# Patient Record
Sex: FEMALE | Race: OTHER | Employment: UNEMPLOYED | URBAN - METROPOLITAN AREA
[De-identification: names, ages, dates, MRNs, and addresses within clinical notes are randomized per-mention and may not be internally consistent; named-entity substitution may affect disease eponyms.]

---

## 2019-07-15 ENCOUNTER — TELEPHONE (OUTPATIENT)
Dept: INTERNAL MEDICINE CLINIC | Age: 29
End: 2019-07-15

## 2019-07-15 ENCOUNTER — OFFICE VISIT (OUTPATIENT)
Dept: INTERNAL MEDICINE CLINIC | Age: 29
End: 2019-07-15

## 2019-07-15 VITALS
OXYGEN SATURATION: 98 % | SYSTOLIC BLOOD PRESSURE: 107 MMHG | RESPIRATION RATE: 16 BRPM | BODY MASS INDEX: 28.73 KG/M2 | HEIGHT: 66 IN | HEART RATE: 84 BPM | DIASTOLIC BLOOD PRESSURE: 64 MMHG | TEMPERATURE: 98 F | WEIGHT: 178.8 LBS

## 2019-07-15 DIAGNOSIS — R40.20 LOC (LOSS OF CONSCIOUSNESS) (HCC): ICD-10-CM

## 2019-07-15 DIAGNOSIS — Z34.90 PREGNANCY, UNSPECIFIED GESTATIONAL AGE: ICD-10-CM

## 2019-07-15 DIAGNOSIS — J32.9 CHRONIC SINUSITIS, UNSPECIFIED LOCATION: Primary | ICD-10-CM

## 2019-07-15 DIAGNOSIS — N30.00 ACUTE CYSTITIS WITHOUT HEMATURIA: ICD-10-CM

## 2019-07-15 DIAGNOSIS — E07.9 THYROID DISORDER: ICD-10-CM

## 2019-07-15 DIAGNOSIS — E01.0 THYROMEGALY: ICD-10-CM

## 2019-07-15 LAB
BILIRUB UR QL STRIP: NEGATIVE
GLUCOSE UR-MCNC: NEGATIVE MG/DL
KETONES P FAST UR STRIP-MCNC: NORMAL MG/DL
PH UR STRIP: 5 [PH] (ref 4.6–8)
PROT UR QL STRIP: NORMAL
SP GR UR STRIP: 1.02 (ref 1–1.03)
UA UROBILINOGEN AMB POC: NORMAL (ref 0.2–1)
URINALYSIS CLARITY POC: CLEAR
URINALYSIS COLOR POC: YELLOW
URINE BLOOD POC: NORMAL
URINE LEUKOCYTES POC: NEGATIVE
URINE NITRITES POC: NEGATIVE

## 2019-07-15 NOTE — PROGRESS NOTES
HISTORY OF PRESENT ILLNESS  Surya Akbar is a 34 y.o. female [place] refugee presents to establish care. She speaks Divehi,  [id number] facilitates   HPI  She has SOB dizziness, neck feels as if something in it    Not able to breathe well d/t nasal congestion. Saline nasal spray ineffective    Nasal congestion chronic, for seven years. \"meat inside nose\"     If she does use nose drops she cannot do anything     She sometimes loses consciousness, while siting . Symptoms started 4 months ago     Gets SOB and  loses consciousness, usually out for 1.5 minutes    Feel something is bothering her in neck when she can't breathe      Was home with spouse 1 week ago when she had LOC   No seizure activity reported by spouse    LNMP was 1 week before June 1    ED visit June 30. Diagnosed with  UTI and  Pregnancy. Completed antibiotic therapy    Weak, tired all the time. Believes she has thyroid disorder     Drinks 1 L water daily    Children 11 and 10    2 miscarriages      Denies depression or anxiety    Requests form completed to excuse her from attending classes d/t  history of difficulty pregnancy and  Miscarriage      History reviewed. No pertinent past medical history. Current Outpatient Medications   Medication Sig    sodium chloride (AYR SALINE) 0.65 % drop 2 Drops every two (2) hours as needed.  prenatal vit-iron fumarate-fa 27 mg iron- 0.8 mg tab tablet Take 1 Tab by mouth daily. No current facility-administered medications for this visit. History reviewed. No pertinent surgical history. Family History   Problem Relation Age of Onset    Diabetes Mother     Thyroid Disease Mother     Hypertension Father        Review of Systems   Constitutional: Positive for malaise/fatigue. HENT: Positive for congestion and sinus pain. Negative for ear discharge, ear pain, hearing loss, nosebleeds, sore throat and tinnitus. Eyes: Negative.     Respiratory: Positive for shortness of breath. Cardiovascular: Negative for chest pain and palpitations. Gastrointestinal: Negative. Genitourinary: Negative. Musculoskeletal: Positive for neck pain. Skin: Negative. Neurological: Positive for dizziness and loss of consciousness. Negative for tremors, sensory change, speech change, focal weakness, seizures, weakness and headaches. Endo/Heme/Allergies: Positive for environmental allergies. Psychiatric/Behavioral: Negative for depression. The patient is not nervous/anxious and does not have insomnia. Visit Vitals  /64 (BP 1 Location: Left arm, BP Patient Position: Sitting)   Pulse 84   Temp 98 °F (36.7 °C) (Oral)   Resp 16   Ht 5' 6.26\" (1.683 m)   Wt 178 lb 12.8 oz (81.1 kg)   SpO2 98%   BMI 28.63 kg/m²     Physical Exam   Constitutional: She is oriented to person, place, and time. She appears well-developed and well-nourished. No distress. HENT:   Head: Normocephalic and atraumatic. Right Ear: External ear normal.   Left Ear: External ear normal.   Mouth/Throat: Oropharynx is clear and moist. No oropharyngeal exudate. Nasal turbinated, edematous,erythematous    Eyes: Conjunctivae are normal. Right eye exhibits no discharge. Left eye exhibits no discharge. Neck: Thyromegaly present. Cardiovascular: Normal rate, regular rhythm and normal heart sounds. Pulmonary/Chest: Effort normal and breath sounds normal.   Abdominal: Soft. Bowel sounds are normal. She exhibits no distension. There is no tenderness. There is no rebound and no guarding. Musculoskeletal: She exhibits no edema, tenderness or deformity. Lymphadenopathy:     She has no cervical adenopathy. Neurological: She is alert and oriented to person, place, and time. No cranial nerve deficit. Skin: Skin is warm and dry. She is not diaphoretic. Psychiatric: Judgment and thought content normal. Her mood appears anxious.  Her speech is rapid and/or pressured and tangential. Cognition and memory are normal. ASSESSMENT and PLAN    ICD-10-CM ICD-9-CM    1. Chronic sinusitis, unspecified location J32.9 473.9 REFERRAL TO ENT-OTOLARYNGOLOGY   2. Pregnancy, unspecified gestational age Z27.80 V22.2 REFERRAL TO OBSTETRICS AND GYNECOLOGY      prenatal vit-iron fumarate-fa 27 mg iron- 0.8 mg tab tablet   3. Thyroid disorder E07.9 246.9 TSH AND FREE T4   4. LOC (loss of consciousness) ([facility]) R40.20 780.09 HEMOGLOBIN A1C WITH EAG      CBC WITH AUTOMATED DIFF   5. Thyromegaly E01.0 240.9 TSH AND FREE T4      US THYROID/PARATHYROID/SOFT TISS   6. Acute cystitis without hematuria N30.00 595.0 AMB POC URINALYSIS DIP STICK AUTO W/O MICRO     Follow-up and Dispositions    · Return in about 1 day (around 7/16/2019), or if symptoms worsen or fail to improve, for loc,sinusitis. Instructed to start PNV    lab results and schedule of future lab studies reviewed with patient  reviewed medications and side effects in detail      Patient voices understanding and acceptance of this advice and will call back if any further questions or concerns. An After Visit Summary was printed and given to the patient.

## 2019-07-15 NOTE — PATIENT INSTRUCTIONS
Learning About Pregnancy  Your Care Instructions    Your health in the early weeks of your pregnancy is particularly important for your baby's health. Take good care of yourself. Anything you do that harms your body can also harm your baby. Make sure to go to all of your doctor appointments. Regular checkups will help keep you and your baby healthy. How can you care for yourself at home? Diet    · Eat a balanced diet. Make sure your diet includes plenty of beans, peas, and leafy green vegetables.     · Do not skip meals or go for many hours without eating. If you are nauseated, try to eat a small, healthy snack every 2 to 3 hours.     · Do not eat fish that has a high level of mercury, such as shark, swordfish, or mackerel. Do not eat more than one can of tuna each week.     · Drink plenty of fluids, enough so that your urine is light yellow or clear like water. If you have kidney, heart, or liver disease and have to limit fluids, talk with your doctor before you increase the amount of fluids you drink.     · Cut down on caffeine, such as coffee, tea, and cola.     · Do not drink alcohol, such as beer, wine, or hard liquor.     · Take a multivitamin that contains at least 400 micrograms (mcg) of folic acid to help prevent birth defects. Fortified cereal and whole wheat bread are good additional sources of folic acid.     · Increase the calcium in your diet. Try to drink a quart of skim milk each day. You may also take calcium supplements and choose foods such as cheese and yogurt.    Lifestyle    · Make sure you go to your follow-up appointments.     · Get plenty of rest. You may be unusually tired while you are pregnant.     · Get at least 30 minutes of exercise on most days of the week. Walking is a good choice. If you have not exercised in the past, start out slowly. Take several short walks each day.     · Do not smoke. If you need help quitting, talk to your doctor about stop-smoking programs.  These can increase your chances of quitting for good.     · Do not touch cat feces or litter boxes. Also, wash your hands after you handle raw meat, and fully cook all meat before you eat it. Wear gloves when you work in the yard or garden, and wash your hands well when you are done. Cat feces, raw or undercooked meat, and contaminated dirt can cause an infection that may harm your baby or lead to a miscarriage.     · Do not use saunas or hot tubs. Raising your body temperature may harm your baby.     · Avoid chemical fumes, paint fumes, or poisons.     · Do not use illegal drugs or alcohol. Medicines    · Review all of your medicines with your doctor. Some of your routine medicines may need to be changed to protect your baby.     · Use acetaminophen (Tylenol) to relieve minor problems, such as a mild headache or backache or a mild fever with cold symptoms. Do not use nonsteroidal anti-inflammatory drugs (NSAIDs), such as ibuprofen (Advil, Motrin) or naproxen (Aleve), unless your doctor says it is okay.     · Do not take two or more pain medicines at the same time unless the doctor told you to. Many pain medicines have acetaminophen, which is Tylenol. Too much acetaminophen (Tylenol) can be harmful.     · Take your medicines exactly as prescribed. Call your doctor if you think you are having a problem with your medicine.    To manage morning sickness    · If you feel sick when you first wake up, try eating a small snack (such as crackers) before you get out of bed. Allow some time to digest the snack, and then get out of bed slowly.     · Do not skip meals or go for long periods without eating. An empty stomach can make nausea worse.     · Eat small, frequent meals instead of three large meals each day.     · Drink plenty of fluids.  Sports drinks, such as Gatorade or Powerade, are good choices.     · Eat foods that are high in protein but low in fat.     · If you are taking iron supplements, ask your doctor if they are necessary. Iron can make nausea worse.     · Avoid any smells, such as coffee, that make you feel sick.     · Get lots of rest. Morning sickness may be worse when you are tired. Follow-up care is a key part of your treatment and safety. Be sure to make and go to all appointments, and call your doctor if you are having problems. It's also a good idea to know your test results and keep a list of the medicines you take. Where can you learn more? Go to http://leonora-eh.info/. Enter E182 in the search box to learn more about \"Learning About Pregnancy. \"  Current as of: September 5, 2018  Content Version: 11.9  © 4991-5797 [employer]. Care instructions adapted under license by [employer] (which disclaims liability or warranty for this information). If you have questions about a medical condition or this instruction, always ask your healthcare professional. [place] [address] any warranty or liability for your use of this information. Sinusitis: Care Instructions  Your Care Instructions    Sinusitis is an infection of the lining of the sinus cavities in your head. Sinusitis often follows a cold. It causes pain and pressure in your head and face. In most cases, sinusitis gets better on its own in 1 to 2 weeks. But some mild symptoms may last for several weeks. Sometimes antibiotics are needed. Follow-up care is a key part of your treatment and safety. Be sure to make and go to all appointments, and call your doctor if you are having problems. It's also a good idea to know your test results and keep a list of the medicines you take. How can you care for yourself at home? · Take an over-the-counter pain medicine, such as acetaminophen (Tylenol), ibuprofen (Advil, Motrin), or naproxen (Aleve). Read and follow all instructions on the label. · If the doctor prescribed antibiotics, take them as directed.  Do not stop taking them just because you feel better. You need to take the full course of antibiotics. · Be careful when taking over-the-counter cold or flu medicines and Tylenol at the same time. Many of these medicines have acetaminophen, which is Tylenol. Read the labels to make sure that you are not taking more than the recommended dose. Too much acetaminophen (Tylenol) can be harmful. · Breathe warm, moist air from a steamy shower, a hot bath, or a sink filled with hot water. Avoid cold, dry air. Using a humidifier in your home may help. Follow the directions for cleaning the machine. · Use saline (saltwater) nasal washes to help keep your nasal passages open and wash out mucus and bacteria. You can buy saline nose drops at a grocery store or drugstore. Or you can make your own at home by adding 1 teaspoon of salt and 1 teaspoon of baking soda to 2 cups of distilled water. If you make your own, fill a bulb syringe with the solution, insert the tip into your nostril, and squeeze gently. Jennye Garb your nose. · Put a hot, wet towel or a warm gel pack on your face 3 or 4 times a day for 5 to 10 minutes each time. · Try a decongestant nasal spray like oxymetazoline (Afrin). Do not use it for more than 3 days in a row. Using it for more than 3 days can make your congestion worse. When should you call for help? Call your doctor now or seek immediate medical care if:    · You have new or worse swelling or redness in your face or around your eyes.     · You have a new or higher fever.    Watch closely for changes in your health, and be sure to contact your doctor if:    · You have new or worse facial pain.     · The mucus from your nose becomes thicker (like pus) or has new blood in it.     · You are not getting better as expected. Where can you learn more? Go to http://leonora-eh.info/. Enter P382 in the search box to learn more about \"Sinusitis: Care Instructions. \"  Current as of: March 27, 2018  Content Version: 11.9  © 6626-9623 Healthwise, [facility]. Care instructions adapted under license by [employer] (which disclaims liability or warranty for this information). If you have questions about a medical condition or this instruction, always ask your healthcare professional. [address] any warranty or liability for your use of this information. ÇáÊåÇÈ ÇáÌíæÈ ÇáÃäÝíÉ: ÅÑÔÇÏÇÊ ÇáÑÚÇíÉ  [ Sinusitis: Care Instructions ]  ÅÑÔÇÏÇÊ ÇáÑÚÇíÉ      ÇáÊåÇÈ ÇáÌíæÈ ÇáÃäÝíÉ åí ÚÏæì ÊÕíÈ ÈØÇäÉ ÊÌÇæíÝ ÇáÌíæÈ ÇáÃäÝíÉ Ýí ÇáÑÃÓ. æÚÇÏÉ ãÇ íÊÈÚ äÒáÉ ÇáÈÑÏ ÇáÊåÇÈ ÇáÌíæÈ ÇáÃäÝíÉ. æåæ íÊÓÈÈ Ýí Ãáã æÖÛØ Ýí ÇáÑÃÓ æÇáæÌå. æÝí ãÚÙã KXAHBIM¡ íÊÍÓä ÇáÊåÇÈ ÇáÌíæÈ ÇáÃäÝíÉ ãä ÊáÞÇÁ äÝÓå ÎáÇá ÝÊÑÉ ÊÊÑÇæÍ ãä ÃÓÈæÚ Åáì ÃÓÈæÚíä. æáßä ÊÙá ÈÚÖ ÇáÃÚÑÇÖ ÇáØÝíÝÉ áÚÏÉ ÃÓÇÈíÚ. æÝí ÈÚÖ RRYYSOP Êßæä JMQYNBTM ÇáÍíæíÉ ãØáæÈÉ. æÊõÚÏ ãÊÇÈÚÉ ÇáÑÚÇíÉ ÌÒÁðÇ ÑÆíÓíðÇ Ýí ÚáÇÌß æÓáÇãÊß. áÐÇ¡ ÇÍÑÕ Úáì ÊÑÊíÈ ãæÇÚíÏ ÒíÇÑÉ ÇáØÈíÈ HXGONONMD ÈåÇ ÌãíÚðÇ æÇÊÕá ÈØÈíÈß ÅÐÇ ßÇäÊ áÏíß ãÔßáÇÊ. æãä ÇáÌíÏ ßÐáß ãÚÑÝÉ äÊÇÆÌ OVZDGOVC BMRRKTUYM ÈÞÇÆãÉ ÇáÃÏæíÉ ÇáÊí KZAYDUMI. ßíÝ íãßäß ÇáÇÚÊäÇÁ ÈäÝÓß Ýí ÇáãäÒá¿   · ÊäÇæá ÃÍÏ ÃÏæíÉ ÊÓßíä ÇáÃáã ÇáÊí ÊõÕÑÝ Ïæä æÕÝÉ ØÈíÉ ãËá ÃÓíÊÇãíäæÝíä (ÊÇíáíäæá) æÇíÈæÈÑæÝíä (ÃÏÝíá¡ ãæÊÑíä) Ãæ äÇÈÑæßÓíä (ÃáíÝ). ÇÞÑÃ ÌãíÚ WJNYIJAXY ÇáãÈíäÉ Úáì WGLQRA æÇáÊÒã ÈåÇ.  · ÅÐÇ æÕÝ áß ÇáØÈíÈ ÃÍÏ ÇáãÖÇÏÇÊ ÇáÍíæíÉ¡ UPRZWEY ÍÓÈ ÅÑÔÇÏÇÊå. æáÇ ÊÊæÞÝ Úä ÊäÇæáå áãÌÑÏ Ãäß ÊÔÚÑ ÈÊÍÓä. Sylvester Chaparro íáÒãß ÊäÇæá ßæÑÓ ÇáãÖÇÏ ÇáÍíæí ÈÇáßÇãá.  · ÊæÎ ÇáÍÐÑ ÚäÏ ÊäÇæá ÃÏæíÉ äÒáÇÊ ÇáÈÑÏ Ãæ VPKKGAXFMI ÇáÊí ÊõÕÑÝ Ïæä æÕÝÉ ØÈíÉ Åáì ÌÇäÈ ÏæÇÁ ÊÇíáíäæá Ýí ÇáæÞÊ ÐÇÊå. ÍíË ÊÍÊæí ÇáÚÏíÏ ãä åÐå ÇáÃÏæíÉ Úáì ÃÓíÊÇãíäæÝíä¡ æåæ Çáãßæä ÇáÃÓÇÓí Ýí ÊÇíáíäæá. ÇÞÑÃ ÇáãáÕÞÇÊ ááÊÃßÏ ãä ÚÏã ÊÌÇæÒ ÇáÌÑÚÉ ÇáãÞÑÑÉ. ÝÞÏ íáÍÞß ÖÑÑ äÊíÌÉ ÊäÇæá ÌÑÚÉ ÒÇÆÏÉ ãä ÚÞÇÑ ÃÓíÊÇãíäæÝíä (ÊÇíáíäæá).  · ÊäÝÓ åæÇÁð ÏÇÝÆðÇ æÑØÈðÇ ãä ÏÔ ÈÎÇÑ Ãæ ÍãÇã ÓÇÎä Ãæ ÍæÖ ããáæÁ ÈãÇÁ ÓÇÎä. ÊÌäÈ ÇáåæÇÁ ÇáÈÇÑÏ æÇáÌÇÝ. ÞÏ íÝíÏ ÇÓÊÎÏÇã ÌåÇÒ áÊÑØíÈ ÇáÌæ Ýí ÛÑÝÊß. ÇÊÈÚ OTSVNAIIX ÇáÎÇÕÉ ÈÊäÙíÝ åÐÇ ÇáÌåÇÒ.    · ÇÓÊÎÏã ÛÓæáÇð ÃäÝíðÇ Margarite Savers (ãÇÁ ãÇáÍ) ááãÓÇÚÏÉ Ýí ÅÈÞÇÁ RWTQPSB ÇáÃäÝíÉ ECWKDE æäÙøÝ ÇáãÎÇØ æÇáÈßÊíÑíÇ ÈÇáÛÓá. íãßäß ÔÑÇÁ ãÍáæá ãáÍí Ýí Ôßá ÞØÑÇÊ ááÃäÝ ãä ãÊÌÑ ÈÞÇáÉ Ãæ ãä ÅÍÏì ÕíÏáíÇÊ ÇáÃÏæíÉ. Ãæ íãßäß ÊÍÖíÑ ãÍáæá Ýí ÇáãäÒá Úä ØÑíÞ ÅÖÇÝÉ 1 ãáÚÞÉ ÕÛíÑÉ ãä ÇáãáÍ æ1 ãáÚÞÉ ÕÛíÑÉ ãä ÕæÏÇ ÇáÎÈíÒ Åáì ÝäÌÇäíä ãä ÇáãÇÁ ÇáãÞØÑ. æÚäÏ ÅÚÏÇÏ IKDEOL ÇáãáÍí¡ ÇãáÃ KDJAYGV ÇáãØÇØíÉ GFDAHUZJ æÃÏÎá ØÑÝ OEDWLCR ÇáãÓÊÏÞ Ýí ÝÊÍÉ ÇáÃäÝ æÇÖÛØ ÈÑÝÞ. ÇØÑÏ ÇáåæÇÁ ãä ÃäÝß.  · ÖÚ ãäÔÝÉ ãÈááÉ æÓÇÎäÉ Ãæ ßãÇÏÉ Ìíá ÏÇÝÆÉ Úáì æÌåß 3 Ãæ 4 ãÑÇÊ íæãíðÇ áãÏÉ ÊÊÑÇæÍ ãä 5 Åáì 10 ÏÞÇÆÞ Ýí ßá ãÑÉ.  · ÌÑøöÈ ÈÎÇÎ ãÒíáÇð áÇÍÊÞÇä ÇáÃäÝ ãËá ÃæßÓíãíÊÇÒæáíä (ÃÝÑíä). áÇ ÊÓÊÎÏãå áÃßËÑ ãä 3 ÃíÇã ãÊÊÇáíÉ. ÝÞÏ íÄÏí YTSYIRVW áÃßËÑ ãä 3 ÃíÇã Åáì ÒíÇÏÉ ÇáÇÍÊÞÇä. ãÊì íÌÈ YIIWNZB áØáÈ ÇáãÓÇÚÏÉ¿  ÇÊÕá ÈØÈíÈß ÇáÂä Ãæ ÇÈÍË Úä ÇáÑÚÇíÉ ÇáØÈíÉ ÇáÝæÑíÉ Ýí ÇáÍÇáÇÊ ÇáÊÇáíÉ:   · ÇáÅÕÇÈÉ ÈÊæÑã Ãæ ÇÍãÑÇÑ ÌÏíÏ Ýí ÇáæÌå Ãæ Íæá ÇáÚíä Ãæ ÊÏåæÑ ÍÇáÉ ÇáÊæÑã Ãæ MWALWOSK Úä ÇáÓÇÈÞ.  · ÅÕÇÈÊß ÈÍãì ÌÏíÏÉ Ãæ ÇÑÊÝÇÚ ÍÑÇÑÉ Íãì ßÇäÊ áÏíß. íÊÚíä Úáíß ÇáãÑÇÞÈÉ Úä ßËÈ áÃíÉ ÊÛííÑÇÊ ÊØÑÃ Úáì ÇáÕÍÉ QUBATB Úáì DLLJACZ ÈÇáØÈíÈ Ýí MBYZVUA ÇáÊÇáíÉ:   · ÅÕÇÈÊß ÈÃáã ÌÏíÏ Ýí ÇáæÌå Ãæ ÒíÇÏÊå.  · ÒíÇÏÉ Óãß ÇáãÎÇØ ÇáäÇÒá ãä ÇáÃäÝ (ãËá ÇáÞíÍ) Ãæ ÇÍÊæÇÄå Úáì Ïã ÌÏíÏ.  · ÚÏã ÇáÊÍÓøõä ßãÇ åæ ãÊæÞÚ. Ãíä íãßä ãÚÑÝÉ ÇáãÒíÏ¿  ÇäÊÞÇá Åáì [email or web address] ÏÎæá Heike.Miss íãßäß ãÚÑÝÉ ÇáãÒíÏ ãä ÎáÇá ãÑÈÚ ÇáÈÍË \"ÇáÊåÇÈ ÇáÌíæÈ ÇáÃäÝíÉ: ÅÑÔÇÏÇÊ ÇáÑÚÇíÉ - [ Sinusitis: Care Instructions ]. \"  © 1985-9092 Healthwise, [employer]. ÊãÊ ÊåíÆÉ ÅÑÔÇÏÇÊ ÇáÚäÇíÉ ÈãæÌÈ ÊÑÎíÕ ãä ãÎÊÕ ÇáÑÚÇíÉ ÇáÕÍíÉ áÏíß. ÅÐÇ ßÇäÊ áÏíß ÃíÉ DCKPOAZGO Úä ÍÇáÉ ØÈíÉ Ãæ Ãí ãä åÐå HFRWKBMEG¡ ÝÊæÌå ÏæãðÇ CLDJGBB Åáì ãÎÊÕ ÇáÑÚÇíÉ ÇáÕÍíÉ. ÊäÝí ãäÙãÉ [employer], [employer] Piedad Kehr ÖãÇä Ãæ Tia Ishaan PGVVXFI åÐå FFSHULURH. ÅÕÏÇÑ LCEMLQR: 11.9 ãÍÏøË NVPYLERH ãä: 10 ÑÌÈ 2260      ÇáÊÚÑÝ Úáì ÇáÃãæÑ LWMPZOYD TYTTMC  [ Learning About Pregnancy ]  ÅÑÔÇÏÇÊ ÇáÚäÇíÉ ÇáÎÇÕÉ Èß  ÇÚÊäí GKAZKA ÎÇÕÉ Ýí ÇáÃÓÇÈíÚ YCTJDS ãä MKWPS ãä ÃÌá ÕÍÉ ØÝáß. ÇÚÊäí ÈäÝÓß ÌíÏðÇ.  ÝÃí ÔíÁ ÊÝÚáíäå íÖÑ ÈÌÓãß íãßä Ãä íÄÐí ÃíÖðÇ ØÝáß. ÇáÊÒãí ÈÍÖæÑ ÌãíÚ ÇáãæÇÚíÏ ãÚ ÇáØÈíÈ. FQPQPIWBQ [address] æÕÍÉ ØÝáß. Åä ãÊÇÈÚÉ ÇáÑÚÇíÉ ÌÒÁ ÑÆíÓí Ýí ÚáÇÌß æÓáÇãÊß. ÊÃßÏí ãä ÅÌÑÇÁ ÊÑÊíÈÇÊ ÌãíÚ ãæÇÚíÏ ÒíÇÑÉ ÇáØÈíÈ æÇáÐåÇÈ ÅáíåÇ JNYVOOOC ÈØÈíÈß ÅÐÇ ßÇäÊ áÏíß ãÔßáÇÊ. æãä OWAYUNA ÇáÌíÏÉ ÃíÖðÇ ãÚÑÝÉ äÊÇÆÌ JOCLZEGC PHHZLXOVT ÈÞÇÆãÉ YIUOEUXA ÇáÊí JFEAKHOVXI. ßíÝ íãßäß ÇáÇÚÊäÇÁ ÈäÝÓß Ýí JPYVNP¿  ÇáäÙÇã ÇáÛÐÇÆí   · ÊäÇæáí ÇáÃßá æÝÞ äÙÇã ÛÐÇÆí ãÊæÇÒä. ÊÃßÏí ãä ÇÍÊæÇÁ äÙÇãß ÇáÛÐÇÆí Úáì ßãíÉ æÝíÑÉ ãä ÇáÈÞæáíÇÊ OZXHDGJPR MYXCZHTKG ÇáÎÖÑÇÁ ÐÇÊ ÇáÃæÑÇÞ.  · áÇ ÊÊÎØí æÌÈÇÊ Ãæ ÊÌáÓí ÈÏæä ØÚÇã áÝÊÑÉ ØæíáÉ. ÅÐÇ ßäÊ ÊÔÚÑíä ÈÇáÛËíÇä¡ ÝÍÇæáí ÊäÇæá æÌÈÉ ÕÍíÉ ÎÝíÝÉ ÕÛíÑÉ ßá 2 Åáì 3 ÓÇÚÇÊ.  · áÇ ÊÊäÇæáí ÃÓãÇßðÇ ÊÍÊæí Úáì äÓÈÉ ÚÇáíÉ ãä ÇáÒÆÈÞ¡ ãËá Óãß ÇáÞÑÔ Ãæ Óãß ÃÈæ ÓíÝ Ãæ ÇáãÇßÑíá. áÇ ÊÊäÇæáí ÃßËÑ ãä ÚáÈÉ ÊæäÉ ÃÓÈæÚíðÇ.  · ÊäÇæáí ßãíÉ æÝíÑÉ ãä VVAMYOZ ÈãÇ íßÝí áÃä íÕÈÍ áæä Èæáß ÃÕÝÑ TMZNFL Ãæ ÕÇÝíðÇ ãËá ÇáãÇÁ. ÅÐÇ ßäÊ ãä ÇáãÕÇÈíä ÈÃãÑÇÖ Çáßáì Ãæ ÇáÞáÈ Ãæ ÇáßÈÏ æáÒã Úáíß ÊÞáíá AMMXRBK¡ ÝÊÍÏËí ãÚ ØÈíÈß ÞÈá Ãä ÊÞæãí ÈÒíÇÏÉ ßãíÉ NIMGZTB ÇáÊí ÊÔÑÈíäåÇ.  · Þááí ãä ÊäÇæá ÇáßÇÝííä¡ ãËá ÇáÞåæÉ æÇáÔÇí XNQFEXC.  · áÇ ÊÊäÇæáí QINNKJ ãØáÞðÇ¡ ãËá ÇáÈíÑÉ Ãæ ÇáäÈíÐ Ãæ MXRLRBU ÇáßÍæáíÉ.  · ÊäÇæáí ÝíÊÇãíäÇÊ ãÊÚÏÏÉ ÊÍÊæí Úáì ãÇ áÇ íÞá Úä 400 ãíßÑæÌÑÇã ãä ÍãÖ ÇáÝæáíß ááãÓÇÚÏÉ Úáì ãäÚ ÙåæÑ ÚíæÈ ÎáÞíÉ Ýí ÇáãæáæÏ. æÊÚÏ ÇáÍÈæÈ IHCTVRV æÎÈÒ ÇáÞãÍ ÇáßÇãá ãÕÇÏÑ ÅÖÇÝíÉ ÌíÏÉ ãä ÍãÖ ÇáÝæáíß.  · ÃßËÑí ãä ÇáßÇáÓíæã Ýí ÇáäÙÇã ÇáÛÐÇÆí ÇáÐí ÊÊÈÚíäå. ÍÇæáí ÔÑÈ ÑÈÚ ÌÇáæä ãä ÇáÍáíÈ ÇáÎÇáí ãä ÇáÏÓã íæãíðÇ íãßäß ÃíÖðÇ ÊäÇæá ãßãáÇÊ ÇáßÇáÓíæã æÇÎÊíÇÑ ÃØÚãÉ ãËá ÇáÌÈä æÇáÒÈÇÏí. ÃÓáæÈ ÇáÍíÇÉ   · ÇáÊÒãí ÈÍÖæÑ ãæÇÚíÏ ÇáÇÓÊÔÇÑÉ æÇáãÊÇÈÚÉ ãÚ ÇáØÈíÈ.  · ÎõÐí HRPKECA ßÇÝíÉ ãä ÇáÑÇÍÉ. ÞÏ Êßæäíä ãÊÚÈÉ ÈÔßá ÛíÑ ÚÇÏí ÃËäÇÁ ÇáÍãá.  · Þæãí Úáì ÇáÃÞá ÈããÇÑÓÉ ÇáÊãÑíäÇÊ áãÏÉ 30 ÏÞíÞÉ Ýí ãÚÙã ÃíÇã ÇáÃÓÈæÚ. æíÚÏ ÇáãÔí ÎíÇÑðÇ ÌíÏðÇ. ÅÐÇ áã Êßæäí ãä ÇááÇÆí ÇÚÊÏä Úáì ããÇÑÓÉ ÊãÇÑíä Ýí ÇáãÇÖí¡ ÝÇÈÏÆí ÈÔßá ÊÏÑíÌí.  [address] ÚÏÏðÇ ãä ÇáÊãÔíÇÊ ÇáÞÕíÑÉ íæãíðÇ.  · ÃÞáÚí Úä ÇáÊÏÎíä. ÅÐÇ ßäÊ ÈÍÇÌÉ Åáì ãÓÇÚÏÉ ãä ÃÌá ÇáÅÞáÇÚ Úä ÇáÊÏÎíä¡ ÝÊÍÏËí ãÚ ÇáØÈíÈ Íæá ÈÑÇãÌ æÃÏæíÉ æÞÝ ÇáÊÏÎíä. íãßä Ãä íÒíÏ åÐÇ ãä ÝÑÕ ÇáÅÞáÇÚ Úä ÇáÊÏÎíä Åáì ÇáÃÈÏ.  · áÇ ÊáãÓí ÈÑÇÒ ÇáÞØØ Ãæ ÕäÇÏíÞ ÇáÞãÇãÉ. ÃíÖðÇ¡ ÇÛÓáí íÏíß ÈÚÏ GEOACUR ãÚ ÇááÍæã ÇáäíÆÉ¡ æÇÍÑÕí Úáì ÇáØåí ÇáßÇãá áßá ÇááÍã ÞÈá ÊäÇæáå. ÇÑÊÏí ÞÝÇÒÇÊ ÚäÏ ÇáÚãá Ýí ÇáÝäÇÁ Ãæ ÇáÍÏíÞÉ¡ æÇÛÓáí íÏíß ÌíÏðÇ ÚäÏ ÇáÇäÊåÇÁ. íãßä Ãä íÓÈÈ ÈÑÇÒ ÇáÞØØ NNNPPW ÛíÑ MEABGVQ ÌíÏðÇ Ãæ ÇáäíÁ æÇáÊÑÇÈ ÇáãáæË Ýí ÇáÅÕÇÈÉ ÈÚÏæì ÞÏ ÊÖÑ ØÝáß Ãæ ÊÄÏí Åáì ÇáÅÌåÇÖ.  · áÇ ÊÃÎÐí ÍãÇãÇÊ ÈÎÇÑ Ãæ ÊÛÊÓáí Ýí ÃÍæÇÖ ÇáãíÇå ÇáÓÇÎäÉ. Åä ÑÝÚ ÏÑÌÉ ÍÑÇÑÉ ÌÓãß ÞÏ íÖÑ ÈØÝáß.  · ÊÌäÈí ÇáÃÈÎÑÉ ÇáßíãíÇÆíÉ Ãæ ÃÈÎÑÉ ÇáØáÇÁ Ãæ ÇáÓãæã.  · áÇ ÊÊäÇæáí ãØáÞðÇ ÇáÃÏæíÉ ÛíÑ AGSNQVIRQ Ãæ OJANPJ. ÇáÃÏæíÉ   · ÑÇÌÚí ÌãíÚ ÃÏæíÊßö ãÚ ØÈíÈßö. ÞÏ Êßæä åäÇß ÍÇÌÉ Åáì ÊÛííÑ ÈÚÖ ÃÏæíÊßö ÇáÑæÊíäíÉ áÍãÇíÉ ØÝáßö.  · ÊäÇæáí ÇáÃÓíÊÇãíäæÝíä (Êíáíäæá) áÊÓßíä ÇáÂáÇã ÇáÈÓíØÉ¡ ãËá ÇáÕÏÇÚ ÇáÎÝíÝ Ãæ Ãáã ÇáÙåÑ Ãæ ÇáÍãì ÇáÎÝíÝÉ ÇáÊí ÊÕÇÍÈ ÃÚÑÇÖ ÇáÈÑÏ. áÇ ÊÊäÇæáí ÇáÚÞÇÞíÑ ÇááÇÓÊÑæíÏíÉ ÇáãÖÇÏÉ ááÇáÊåÇÈÇÊ (NSAIDs)¡ ãËá ÇáÅíÈæÈÑæÝíä (ÃÏÝíá¡ ãæÊÑíä) Ãæ äÇÈÑæßÓíä (ÃáíÝ)¡ Ïæä Úáã ÇáØÈíÈ.  · áÇ ÊÊäÇæáí ÏæÇÁíä Ãæ ÃßËÑ áÊÎÝíÝ ÇáÂáÇã Ýí æÞÊ æÇÍÏ ÅáÇ æÝÞðÇ áÊæÌíåÇÊ ÇáØÈíÈ. ÍíË ÊÍÊæí ÇáÚÏíÏ ãä ÃÏæíÉ ÚáÇÌ ÇáÃáã Úáì ÃÓíÊÇãíäæÝíä ÇáÐí åæ ÚÈÇÑÉ Úä Êíáíäæá. ÝÞÏ Êßæä ÒíÇÏÉ ÇáÌÑÚÉ ãä ÚÞÇÑ ÃÓíÊÇãíäæÝíä (Êíáíäæá) ÃßËÑ ããÇ íäÈÛí ãÖÑÉ.  · ÊäÇæáí ÇáÃÏæíÉ ÊãÇãðÇ æÝÞÇ áÊæÌíåÇÊ ÇáØÈíÈ. ÇÊÕáí ÈØÈíÈß ÅÐÇ ßäÊ ÊÚÊÞÏíä Ãä áÏíß ãÔßáÉ ÎÇÕÉ ÈÇáÏæÇÁ. ÇáÓíØÑÉ Úáì ÛËíÇä ÇáÕÈÇÍ   · ÅÐÇ ßäÊ ÊÔÚÑíä ÈÇáÅÚíÇÁ ÇáÔÏíÏ ÚäÏ ÈÏÇíÉ ÇáÇÓÊíÞÇÙ¡ ÝÍÇæáí ÊäÇæá æÌÈÉ ÎÝíÝÉ ÕÛíÑÉ (ãËá ÇáÈÓßæíÊ) ÞÈá ÇáÎÑæÌ ãä ÇáÓÑíÑ. ÇãäÍí äÝÓß ÈÚÖ ÇáæÞÊ áåÖã ÇáÈÓßæíÊ¡ Ëã ÇÎÑÌí ãä ÇáÓÑíÑ ÈÈØÁ.  · áÇ ÊÊÎØí æÌÈÇÊ Ãæ ÊÌáÓí ÈÏæä ØÚÇã áÝÊÑÇÊ ØæíáÉ. Åä ÇáãÚÏÉ ÇáÎÇæíÉ ÞÏ ÊÌÚá ÇáÛËíÇä ÃßËÑ ÓæÁðÇ.  · ÊäÇæáí æÌÈÇÊ ÕÛíÑÉ ãÊßÑÑÉ ÈÏáÇð ãä ËáÇË æÌÈÇÊ ßÈíÑÉ íæãíðÇ.  · ÇÔÑÈí ßãíÉ æÇÝÑÉ ãä BTJDJSP. ÊÚÏ ÇáãÔÑæÈÇÊ ÇáÑíÇÖíÉ¡ ãËá Gatorade Ãæ Powerade¡ ÎíÇÑÇÊ ÌíÏÉ.    · ÊäÇæáí ÇáÃØÚãÉ ÇáÛäíÉ ÈÇáÈÑæÊíä æÞáíáÉ ÇáÏåæä.  · ÅÐÇ ßäÊ RODCZXAN ãßãáÇÊ ÇáÍÏíÏ ÇáÛÐÇÆíÉ¡ ÝÇÓÊÔíÑí. ÇáØÈíÈ Úä ãÏì ÃåãíÊåÇ. ÝÇáÍÏíÏ ãä Çáããßä Ãä íÄÏí Åáì ÊÝÇÞã ÇáÛËíÇä.  · ÊÌäÈí ÃíÉ ÑæÇÆÍ¡ ãËá ÑÇÆÍÉ ÇáÞåæÉ¡ ÇáÊí ÊÌÚáß ÊÔÚÑíä ÈÇáÊÚÈ.  · ÎõÐí XUNEEQU ßÇÝíÉ ãä ÇáÑÇÍÉ. Åä ÛËíÇä ÇáÕÈÇÍ ÞÏ íÓæÁ ÚäÏãÇ Êßæäíä ãÊÚÈÉ. Ãíä íãßä ãÚÑÝÉ ÇáãÒíÏ¿  ÇäÊÞÇá Åáì [email or web address] ÏÎæá Clef.Plain íãßäß ãÚÑÝÉ ÇáãÒíÏ ãä JFOD ãÑÈÚ VZZFG \"ÇáÊÚÑÝ Úáì ÇáÃãæÑ ZMOYPAVC PDUYGD - [ Saundra Milling About Pregnancy ]. \"  © 2601-1915 [employer], [employer]. ÊãÊ ÊåíÆÉ ÅÑÔÇÏÇÊ ÇáÚäÇíÉ ÈãæÌÈ ÊÑÎíÕ ãä ãÎÊÕ ÇáÑÚÇíÉ ÇáÕÍíÉ áÏíß. ÅÐÇ ßÇäÊ áÏíß ÃíÉ ECUAGWOOD Úä ÍÇáÉ ØÈíÉ Ãæ Ãí ãä åÐå PRPTRHZCK¡ ÝÊæÌå ÏæãðÇ TUAEVYS Åáì ãÎÊÕ ÇáÑÚÇíÉ ÇáÕÍíÉ. ÊäÝí ãäÙãÉ [employer], [employer] Tarik Hence ÖãÇä Ãæ [place] Guillaume [id number] åÐå [id number].   ÅÕÏÇÑ ÇáãÍÊæì: 11.9 ãÍÏøË [id number] ãä: 25 NP [id number]

## 2019-07-15 NOTE — PROGRESS NOTES
Rm 7 Kiny[address]anda speaking [id number]    Chief Complaint   Patient presents with   [address]     pregnant not sure how far along she is.  Neck Pain   Pt would like printed Rx's, no pharmacy at the moment  Pt states that she has a problem with her neck, and its causing her to have SOB at times. 1. Have you been to the ER, urgent care clinic since your last visit? Hospitalized since your last visit? [facility], 6/30/19 UTI    2. Have you seen or consulted any other health care providers outside of the [address] [place] [address] since your last visit? Include any pap smears or colon screening.  No    Health Maintenance Due   Topic Date Due    DTaP/Tdap/Td series (1 - Tdap) 02/07/2011    PAP AKA CERVICAL CYTOLOGY  02/07/2011   Pt cannot remember last pap smear     3 most recent PHQ Screens 7/15/2019   Little interest or pleasure in doing things Not at all   Feeling down, depressed, irritable, or hopeless Not at all   Total Score PHQ 2 0       Learning Assessment 7/15/2019   PRIMARY LEARNER Patient   HIGHEST LEVEL OF EDUCATION - PRIMARY LEARNER  DID NOT GRADUATE [employer]   PRIMARY LANGUAGE OTHER (COMMENT)   LEARNER PREFERENCE PRIMARY VIDEOS   ANSWERED BY patient   RELATIONSHIP SELF

## 2019-07-16 LAB
BASOPHILS # BLD AUTO: 0 X10E3/UL (ref 0–0.2)
BASOPHILS NFR BLD AUTO: 0 %
EOSINOPHIL # BLD AUTO: 0.1 X10E3/UL (ref 0–0.4)
EOSINOPHIL NFR BLD AUTO: 1 %
ERYTHROCYTE [DISTWIDTH] IN BLOOD BY AUTOMATED COUNT: 14.7 % (ref 12.3–15.4)
EST. AVERAGE GLUCOSE BLD GHB EST-MCNC: 100 MG/DL
HBA1C MFR BLD: 5.1 % (ref 4.8–5.6)
HCT VFR BLD AUTO: 41.1 % (ref 34–46.6)
HGB BLD-MCNC: 13.2 G/DL (ref 11.1–15.9)
IMM GRANULOCYTES # BLD AUTO: 0 X10E3/UL (ref 0–0.1)
IMM GRANULOCYTES NFR BLD AUTO: 0 %
LYMPHOCYTES # BLD AUTO: 2.8 X10E3/UL (ref 0.7–3.1)
LYMPHOCYTES NFR BLD AUTO: 27 %
MCH RBC QN AUTO: 28.1 PG (ref 26.6–33)
MCHC RBC AUTO-ENTMCNC: 32.1 G/DL (ref 31.5–35.7)
MCV RBC AUTO: 88 FL (ref 79–97)
MONOCYTES # BLD AUTO: 0.7 X10E3/UL (ref 0.1–0.9)
MONOCYTES NFR BLD AUTO: 7 %
NEUTROPHILS # BLD AUTO: 6.7 X10E3/UL (ref 1.4–7)
NEUTROPHILS NFR BLD AUTO: 65 %
PLATELET # BLD AUTO: 319 X10E3/UL (ref 150–450)
RBC # BLD AUTO: 4.69 X10E6/UL (ref 3.77–5.28)
T4 FREE SERPL-MCNC: 1.2 NG/DL (ref 0.82–1.77)
TSH SERPL DL<=0.005 MIU/L-ACNC: 2.77 UIU/ML (ref 0.45–4.5)
WBC # BLD AUTO: 10.3 X10E3/UL (ref 3.4–10.8)

## 2019-07-23 LAB
ANTIBODY SCREEN, EXTERNAL: NEGATIVE
HBSAG, EXTERNAL: NEGATIVE
HIV, EXTERNAL: NEGATIVE
RUBELLA, EXTERNAL: NORMAL
T. PALLIDUM, EXTERNAL: NONREACTIVE
TYPE, ABO & RH, EXTERNAL: NORMAL

## 2019-08-16 ENCOUNTER — APPOINTMENT (OUTPATIENT)
Dept: ULTRASOUND IMAGING | Age: 29
End: 2019-08-16
Attending: EMERGENCY MEDICINE
Payer: MEDICAID

## 2019-08-16 ENCOUNTER — HOSPITAL ENCOUNTER (EMERGENCY)
Age: 29
Discharge: HOME | End: 2019-08-16
Attending: EMERGENCY MEDICINE
Payer: MEDICAID

## 2019-08-16 VITALS
SYSTOLIC BLOOD PRESSURE: 116 MMHG | HEART RATE: 81 BPM | OXYGEN SATURATION: 100 % | DIASTOLIC BLOOD PRESSURE: 74 MMHG | RESPIRATION RATE: 16 BRPM | TEMPERATURE: 98.6 F

## 2019-08-16 DIAGNOSIS — O20.0 THREATENED ABORTION: Primary | ICD-10-CM

## 2019-08-16 LAB
ALBUMIN SERPL-MCNC: 3.4 G/DL (ref 3.5–5)
ALBUMIN/GLOB SERPL: 0.9 {RATIO} (ref 1.1–2.2)
ALP SERPL-CCNC: 49 U/L (ref 45–117)
ALT SERPL-CCNC: 19 U/L (ref 12–78)
ANION GAP SERPL CALC-SCNC: 9 MMOL/L (ref 5–15)
AST SERPL-CCNC: 9 U/L (ref 15–37)
BASOPHILS # BLD: 0 K/UL (ref 0–0.1)
BASOPHILS NFR BLD: 0 % (ref 0–1)
BILIRUB SERPL-MCNC: 0.5 MG/DL (ref 0.2–1)
BUN SERPL-MCNC: 5 MG/DL (ref 6–20)
BUN/CREAT SERPL: 9 (ref 12–20)
CALCIUM SERPL-MCNC: 9.2 MG/DL (ref 8.5–10.1)
CHLORIDE SERPL-SCNC: 105 MMOL/L (ref 97–108)
CO2 SERPL-SCNC: 23 MMOL/L (ref 21–32)
CREAT SERPL-MCNC: 0.56 MG/DL (ref 0.55–1.02)
DIFFERENTIAL METHOD BLD: NORMAL
EOSINOPHIL # BLD: 0.1 K/UL (ref 0–0.4)
EOSINOPHIL NFR BLD: 1 % (ref 0–7)
ERYTHROCYTE [DISTWIDTH] IN BLOOD BY AUTOMATED COUNT: 13.2 % (ref 11.5–14.5)
GLOBULIN SER CALC-MCNC: 3.9 G/DL (ref 2–4)
GLUCOSE SERPL-MCNC: 82 MG/DL (ref 65–100)
HCG SERPL-ACNC: ABNORMAL MIU/ML (ref 0–6)
HCT VFR BLD AUTO: 36.6 % (ref 35–47)
HGB BLD-MCNC: 12.1 G/DL (ref 11.5–16)
IMM GRANULOCYTES # BLD AUTO: 0 K/UL (ref 0–0.04)
IMM GRANULOCYTES NFR BLD AUTO: 0 % (ref 0–0.5)
LYMPHOCYTES # BLD: 2 K/UL (ref 0.8–3.5)
LYMPHOCYTES NFR BLD: 20 % (ref 12–49)
MCH RBC QN AUTO: 28.8 PG (ref 26–34)
MCHC RBC AUTO-ENTMCNC: 33.1 G/DL (ref 30–36.5)
MCV RBC AUTO: 87.1 FL (ref 80–99)
MONOCYTES # BLD: 0.6 K/UL (ref 0–1)
MONOCYTES NFR BLD: 6 % (ref 5–13)
NEUTS SEG # BLD: 7.2 K/UL (ref 1.8–8)
NEUTS SEG NFR BLD: 73 % (ref 32–75)
NRBC # BLD: 0 K/UL (ref 0–0.01)
NRBC BLD-RTO: 0 PER 100 WBC
PLATELET # BLD AUTO: 223 K/UL (ref 150–400)
PMV BLD AUTO: 12.6 FL (ref 8.9–12.9)
POTASSIUM SERPL-SCNC: 3.7 MMOL/L (ref 3.5–5.1)
PROT SERPL-MCNC: 7.3 G/DL (ref 6.4–8.2)
RBC # BLD AUTO: 4.2 M/UL (ref 3.8–5.2)
SODIUM SERPL-SCNC: 137 MMOL/L (ref 136–145)
WBC # BLD AUTO: 9.9 K/UL (ref 3.6–11)

## 2019-08-16 PROCEDURE — 80053 COMPREHEN METABOLIC PANEL: CPT

## 2019-08-16 PROCEDURE — 84702 CHORIONIC GONADOTROPIN TEST: CPT

## 2019-08-16 PROCEDURE — 76817 TRANSVAGINAL US OBSTETRIC: CPT

## 2019-08-16 PROCEDURE — 86900 BLOOD TYPING SEROLOGIC ABO: CPT

## 2019-08-16 PROCEDURE — 76801 OB US < 14 WKS SINGLE FETUS: CPT

## 2019-08-16 PROCEDURE — 99283 EMERGENCY DEPT VISIT LOW MDM: CPT

## 2019-08-16 PROCEDURE — 85025 COMPLETE CBC W/AUTO DIFF WBC: CPT

## 2019-08-16 PROCEDURE — 36415 COLL VENOUS BLD VENIPUNCTURE: CPT

## 2019-08-16 NOTE — ED TRIAGE NOTES
reports pt is currently 10-12 weeks pregnant and is having bright red vaginal bleeding. Pt speaks Czech and  is requesting female doctor.

## 2019-08-17 ENCOUNTER — HOSPITAL ENCOUNTER (EMERGENCY)
Age: 29
Discharge: HOME | End: 2019-08-18
Attending: EMERGENCY MEDICINE
Payer: MEDICAID

## 2019-08-17 VITALS
SYSTOLIC BLOOD PRESSURE: 115 MMHG | DIASTOLIC BLOOD PRESSURE: 75 MMHG | RESPIRATION RATE: 16 BRPM | HEART RATE: 98 BPM | OXYGEN SATURATION: 98 % | TEMPERATURE: 98.3 F

## 2019-08-17 DIAGNOSIS — O20.9 VAGINAL BLEEDING AFFECTING EARLY PREGNANCY: Primary | ICD-10-CM

## 2019-08-17 LAB
ABO + RH BLD: NORMAL
BLOOD GROUP ANTIBODIES SERPL: NORMAL
SPECIMEN EXP DATE BLD: NORMAL

## 2019-08-17 PROCEDURE — 99282 EMERGENCY DEPT VISIT SF MDM: CPT

## 2019-08-17 NOTE — ED PROVIDER NOTES
HPI       34y F here with vaginal bleeding. She is approx 10w pregnant. Started about 1.5 hours ago. Has some diffuse lower abdominal pain. Hx of ectopic pregnancy. No back or flank pain. No fever. No vomiting. Pregnancy has otherwise been ok. Pt reports this is 4th pregnancy - other pregnancies also complicated by premature births. Hx obtained using Urdu  phone. Pt and  asking for female provider to do physical exam.    History reviewed. No pertinent past medical history. History reviewed. No pertinent surgical history.       Family History:   Problem Relation Age of Onset    Diabetes Mother     Thyroid Disease Mother     Hypertension Father        Social History     Socioeconomic History    Marital status:      Spouse name: Not on file    Number of children: Not on file    Years of education: Not on file    Highest education level: Not on file   Occupational History    Not on file   Social Needs    Financial resource strain: Not on file    Food insecurity:     Worry: Not on file     Inability: Not on file    Transportation needs:     Medical: Not on file     Non-medical: Not on file   Tobacco Use    Smoking status: Never Smoker    Smokeless tobacco: Never Used   Substance and Sexual Activity    Alcohol use: Never     Frequency: Never    Drug use: Never    Sexual activity: Yes     Partners: Female     Birth control/protection: None   Lifestyle    Physical activity:     Days per week: Not on file     Minutes per session: Not on file    Stress: Not on file   Relationships    Social connections:     Talks on phone: Not on file     Gets together: Not on file     Attends Cheondoism service: Not on file     Active member of club or organization: Not on file     Attends meetings of clubs or organizations: Not on file     Relationship status: Not on file    Intimate partner violence:     Fear of current or ex partner: Not on file     Emotionally abused: Not on file Physically abused: Not on file     Forced sexual activity: Not on file   Other Topics Concern    Not on file   Social History Narrative    Not on file         ALLERGIES: Patient has no known allergies. Review of Systems   Review of Systems   Constitutional: (-) weight loss. HEENT: (-) stiff neck   Eyes: (-) discharge. Respiratory: (-) cough. Cardiovascular: (-) syncope. Gastrointestinal: (-) blood in stool. Genitourinary: (-) hematuria. Musculoskeletal: (-) myalgias. Neurological: (-) seizure. Skin: (-) petechiae  Lymph/Immunologic: (-) enlarged lymph nodes  All other systems reviewed and are negative. Vitals:    08/16/19 2055   BP: 118/75   Pulse: 86   Resp: 16   Temp: 98.1 °F (36.7 °C)   SpO2: 99%            Physical Exam Nursing note and vitals reviewed. Constitutional: oriented to person, place, and time. appears well-developed and well-nourished. No distress. Head: Normocephalic and atraumatic. Sclera anicteric  Nose: No rhinorrhea  Mouth/Throat: Oropharynx is clear and moist. Pharynx normal  Eyes: Conjunctivae are normal. Pupils are equal, round, and reactive to light. Right eye exhibits no discharge. Left eye exhibits no discharge. Neck: Painless normal range of motion. Neck supple. No LAD. Cardiovascular: Normal rate, regular rhythm, normal heart sounds and intact distal pulses. Exam reveals no gallop and no friction rub. No murmur heard. Pulmonary/Chest:  No respiratory distress. No wheezes. No rales. No rhonchi. No increased work of breathing. No accessory muscle use. Good air exchange throughout. Abdominal: soft, non-tender, no rebound or guarding. No hepatosplenomegaly. Normal bowel sounds throughout. Back: no tenderness to palpation, no deformities, no CVA tenderness  Extremities/Musculoskeletal: Normal range of motion. no tenderness. No edema. Distal extremities are neurovasc intact. Lymphadenopathy:   No adenopathy.    Neurological:  Alert and oriented to person, place, and time. Coordination normal. CN 2-12 intact. Motor and sensory function intact. Skin: Skin is warm and dry. No rash noted. No pallor. MDM 34y F here with vaginal bleeding. Large subchorionic bleed on ultrasound and still some bleeding on exam. Discussed with ob - same threatened ab precautions. Will see if she can move her follow-up sooner.        Procedures

## 2019-08-17 NOTE — DISCHARGE INSTRUCTIONS
Patient Education        Threatened Miscarriage: Care Instructions  Your Care Instructions    Some women have light spotting or bleeding during the first 12 weeks of pregnancy. In some cases this is normal. Light spotting or bleeding can also be a sign of a possible loss of the pregnancy. This is called a threatened miscarriage. At this point, the doctor may not be able to tell if your vaginal bleeding is normal or is a sign of a miscarriage. In early pregnancy, things such as stress, exercise, and sex do not cause miscarriage. You may be worried or upset about the possibility of losing your pregnancy. But do not blame yourself. There is no treatment to stop a threatened miscarriage. If you do have a miscarriage, there was nothing you could have done to prevent it. A miscarriage usually means that the pregnancy is not developing normally. The doctor has checked you carefully, but problems can develop later. If you notice any problems or new symptoms, get medical treatment right away. Follow-up care is a key part of your treatment and safety. Be sure to make and go to all appointments, and call your doctor if you are having problems. It's also a good idea to know your test results and keep a list of the medicines you take. How can you care for yourself at home? · If you do have a miscarriage, you will probably have some vaginal bleeding for 1 to 2 weeks. Use pads instead of tampons. · Take acetaminophen (Tylenol) for cramps. Read and follow all instructions on the label. · Do not take two or more pain medicines at the same time unless the doctor told you to. Many pain medicines have acetaminophen, which is Tylenol. Too much acetaminophen (Tylenol) can be harmful. · Do not have sex until your doctor says it is okay. · Get lots of rest over the next several days. · You may do your normal activities if you feel well enough to do them. But do not do any heavy exercise until your doctor says it is okay.   · Eat a balanced diet that is high in iron and vitamin C. Foods rich in iron include red meat, shellfish, eggs, beans, and leafy green vegetables. Foods high in vitamin C include citrus fruits, tomatoes, and broccoli. Talk to your doctor about whether you need to take iron pills or a multivitamin. · Do not drink alcohol or use tobacco or illegal drugs. · Do not smoke. If you need help quitting, talk to your doctor about stop-smoking programs and medicines. These can increase your chances of quitting for good. When should you call for help? Call 911 anytime you think you may need emergency care. For example, call if:    · You passed out (lost consciousness).    Call your doctor now or seek immediate medical care if:    · You have severe vaginal bleeding.     · You are dizzy or lightheaded, or you feel like you may faint.     · You have new or worse pain in your belly or pelvis.     · You have a fever.     · You have vaginal discharge that smells bad.    Watch closely for changes in your health, and be sure to contact your doctor if:    · You do not get better as expected. Where can you learn more? Go to http://leonora-eh.info/. Enter Y217 in the search box to learn more about \"Threatened Miscarriage: Care Instructions. \"  Current as of: September 5, 2018  Content Version: 12.1  © 2459-6285 [employer]. Care instructions adapted under license by [employer] (which disclaims liability or warranty for this information). If you have questions about a medical condition or this instruction, always ask your healthcare professional. [place] [address] any warranty or liability for your use of this information.

## 2019-08-17 NOTE — ED NOTES
Pelvic exam performed due to patient request for female provider. Moderate amount of dark red vaginal bleeding noted, with mild vaginal tenderness. No cervical motion tenderness. Mild uterine tenderness, no appreciable adnexal fullness or tenderness. Samples collected for KOH, wet prep and GC/Chlamydia.

## 2019-08-18 NOTE — DISCHARGE INSTRUCTIONS
Patient Education        Subchorionic Hematoma: Care Instructions  Your Care Instructions    A subchorionic hematoma or hemorrhage is bleeding under one of the membranes (chorion) that surrounds the embryo inside the uterus. It is a common cause of bleeding in early pregnancy. The main symptom is vaginal bleeding. But some women don't have symptoms. They may find out they have a hematoma during an ultrasound test.  In most cases, the bleeding goes away on its own. Most women go on to have a healthy baby. But in some cases, the bleeding is a sign of a miscarriage or other problem with the pregnancy. Your doctor may want to do a follow-up ultrasound. Follow-up care is a key part of your treatment and safety. Be sure to make and go to all appointments, and call your doctor if you are having problems. It's also a good idea to know your test results and keep a list of the medicines you take. How can you care for yourself at home? · Keep track of any bleeding, and follow the guidelines for when to call your doctor. · Keep in mind that some bleeding during the first trimester or an abnormal finding on an ultrasound may:  ? Not cause any problems for you or the baby. ? Turn out to be something more serious. But if this happens, it's best to find out early. Then you and your doctor can manage any complications sooner rather than later. When should you call for help? Call 911 anytime you think you may need emergency care. For example, call if:    · You have sudden, severe pain in your belly or pelvis.     · You passed out (lost consciousness).     · You have severe vaginal bleeding.    Call your doctor now or seek immediate medical care if:    · You are dizzy or lightheaded, or you feel like you may faint.     · You have new or increased pain in your belly or pelvis.     · You have new or more vaginal bleeding.     · You have pain in the vaginal area.     · You have a fever.     · You think you may have passed tissue. Save any tissue that you pass. Take it to your doctor's office as soon as you can.    Watch closely for changes in your health, and be sure to contact your doctor if:    · You have new or worse vaginal symptoms, such as pain, itching, or a discharge.     · You do not get better as expected. Where can you learn more? Go to http://leonora-eh.info/. Ermelinda Castleman in the search box to learn more about \"Subchorionic Hematoma: Care Instructions. \"  Current as of: September 5, 2018  Content Version: 12.1  © 7415-5201 [employer]. Care instructions adapted under license by [employer] (which disclaims liability or warranty for this information). If you have questions about a medical condition or this instruction, always ask your healthcare professional. [address] any warranty or liability for your use of this information.

## 2019-08-18 NOTE — ED NOTES
Discharge instructions discussed with pt by provider using [employer] phone. . Pt verbalized understanding. Pt ambulatory from department, gait steady.

## 2019-08-18 NOTE — ED TRIAGE NOTES
Pt arrives ambulatory to department with CC of vaginal bleeding and is currently 12 weeks pregnant. Pt was seen here last night for the same complaint, where she had an ultrasound that showed a viable pregnancy. Pt states that her vaginal bleeding has increased since last night. Pt has not followed up with her OB since last night. This triage note was translated using the "[employer] phone.

## 2019-08-18 NOTE — ED PROVIDER NOTES
34 y.o. Female ( A2) with no significant past medical history presents ambulatory and accompanied by spouse with chief complaint of increased vaginal bleeding since being seen here last night in the ED. The pt explains that she is currently 12 weeks pregnant with established OB/GYN care here at [facility], noting that she has not experienced any complications with her pregnancy thus far. Per chart records, the pt had a normal US yesterday indicating normal IUP at 11 weeks, with a large subchorionic hemorrhage. She also had a quant of [id number]. Pt denies any pain, nausea, vomiting, or any other symptoms at this time. There are no other acute medical concerns at this time. Social hx: Patient denies Tobacco use. Denies EtOH use. Denies illicit drug abuse. PCP: Jory Fernando NP    Note written by [employer], as dictated by Leela Yeung MD 11:44 PM      The history is provided by the patient and the spouse. A  was used. History reviewed. No pertinent past medical history. History reviewed. No pertinent surgical history.       Family History:   Problem Relation Age of Onset    Diabetes Mother     Thyroid Disease Mother     Hypertension Father        Social History     Socioeconomic History    Marital status:      Spouse name: Not on file    Number of children: Not on file    Years of education: Not on file    Highest education level: Not on file   Occupational History    Not on file   Social Needs    Financial resource strain: Not on file    Food insecurity:     Worry: Not on file     Inability: Not on file    Transportation needs:     Medical: Not on file     Non-medical: Not on file   Tobacco Use    Smoking status: Never Smoker    Smokeless tobacco: Never Used   Substance and Sexual Activity    Alcohol use: Never     Frequency: Never    Drug use: Never    Sexual activity: Yes     Partners: Female     Birth control/protection: None   Lifestyle  Physical activity:     Days per week: Not on file     Minutes per session: Not on file    Stress: Not on file   Relationships    Social connections:     Talks on phone: Not on file     Gets together: Not on file     Attends Episcopalian service: Not on file     Active member of club or organization: Not on file     Attends meetings of clubs or organizations: Not on file     Relationship status: Not on file    Intimate partner violence:     Fear of current or ex partner: Not on file     Emotionally abused: Not on file     Physically abused: Not on file     Forced sexual activity: Not on file   Other Topics Concern    Not on file   Social History Narrative    Not on file         ALLERGIES: Patient has no known allergies. Review of Systems   Constitutional: Negative for chills and fever. Respiratory: Negative for shortness of breath. Cardiovascular: Negative for chest pain. Gastrointestinal: Negative for abdominal pain, blood in stool, diarrhea, nausea and vomiting. Genitourinary: Positive for vaginal bleeding. Negative for difficulty urinating, dysuria and pelvic pain. All other systems reviewed and are negative. Vitals:    08/17/19 2328   BP: 115/75   Pulse: 98   Resp: 16   Temp: 98.3 °F (36.8 °C)   SpO2: 98%            Physical Exam   Constitutional: She is oriented to person, place, and time. She appears well-developed and well-nourished. No distress. HENT:   Head: Normocephalic and atraumatic. Mouth/Throat: Oropharynx is clear and moist.   Eyes: Pupils are equal, round, and reactive to light. No scleral icterus. Neck: Normal range of motion. Neck supple. No thyromegaly present. Cardiovascular: Normal rate, regular rhythm, normal heart sounds and intact distal pulses. No murmur heard. Pulmonary/Chest: Effort normal and breath sounds normal. No respiratory distress. Abdominal: Soft. Bowel sounds are normal. She exhibits no distension. There is no tenderness.    Musculoskeletal: Normal range of motion. She exhibits no edema. Neurological: She is alert and oriented to person, place, and time. Skin: Skin is warm and dry. No rash noted. She is not diaphoretic. Nursing note and vitals reviewed. Note written by Elsa Rodriguez, as dictated by Kimmie Medina MD 11:44 PM    MDM  Number of Diagnoses or Management Options  Vaginal bleeding affecting early pregnancy:   Diagnosis management comments: A:  Vag bleeding in first trimester preg. VS stable. Had Pelvic US and labs done yesterday. Today, bedside EMBU shows IUP with + FHT. + subchorionic hemorrhage. Stable for discharge to f/u with OB.          Procedures

## 2019-09-23 LAB — GRBS, EXTERNAL: NORMAL

## 2020-02-28 ENCOUNTER — HOSPITAL ENCOUNTER (OUTPATIENT)
Dept: OTHER | Age: 30
Discharge: HOME | DRG: 540 | End: 2020-02-28
Payer: MEDICAID

## 2020-02-28 ENCOUNTER — ANESTHESIA EVENT (OUTPATIENT)
Dept: LABOR AND DELIVERY | Age: 30
DRG: 540 | End: 2020-02-28
Payer: MEDICAID

## 2020-02-28 ENCOUNTER — TELEPHONE (OUTPATIENT)
Dept: OBGYN | Age: 30
End: 2020-02-28

## 2020-02-28 VITALS — WEIGHT: 189 LBS | BODY MASS INDEX: 30.37 KG/M2 | HEIGHT: 66 IN

## 2020-02-28 LAB
ERYTHROCYTE [DISTWIDTH] IN BLOOD BY AUTOMATED COUNT: 13 % (ref 11.5–14.5)
HCT VFR BLD AUTO: 40.5 % (ref 35–47)
HGB BLD-MCNC: 13.7 G/DL (ref 11.5–16)
MCH RBC QN AUTO: 30.3 PG (ref 26–34)
MCHC RBC AUTO-ENTMCNC: 33.8 G/DL (ref 30–36.5)
MCV RBC AUTO: 89.6 FL (ref 80–99)
NRBC # BLD: 0 K/UL (ref 0–0.01)
NRBC BLD-RTO: 0 PER 100 WBC
PLATELET # BLD AUTO: 217 K/UL (ref 150–400)
PMV BLD AUTO: 12.5 FL (ref 8.9–12.9)
RBC # BLD AUTO: 4.52 M/UL (ref 3.8–5.2)
WBC # BLD AUTO: 10.6 K/UL (ref 3.6–11)

## 2020-02-28 PROCEDURE — 36415 COLL VENOUS BLD VENIPUNCTURE: CPT

## 2020-02-28 PROCEDURE — 85027 COMPLETE CBC AUTOMATED: CPT

## 2020-02-28 NOTE — PROGRESS NOTES
Patient here for Pre-Admission Testing (PAT) for scheduled  section. PAT packet reviewed with the patient. Labs drawn and sent. MARIA LUISA wipes and preventing surgical site infection education given to the patient. Education also provided to be NPO after midnight and to arrive for scheduled procedure at 0600 on 3/1/20. Patient verbalizes understanding and sent home with PAT packet for further review. Used blue [employer] phone to complete PAT education. Numerous questions answered. Patient verbalizes understanding. Titus Ortiz

## 2020-03-02 ENCOUNTER — ANESTHESIA (OUTPATIENT)
Dept: LABOR AND DELIVERY | Age: 30
DRG: 540 | End: 2020-03-02
Payer: MEDICAID

## 2020-03-02 ENCOUNTER — HOSPITAL ENCOUNTER (INPATIENT)
Age: 30
LOS: 3 days | Discharge: HOME | DRG: 540 | End: 2020-03-05
Attending: OBSTETRICS & GYNECOLOGY | Admitting: OBSTETRICS & GYNECOLOGY
Payer: MEDICAID

## 2020-03-02 PROBLEM — Z98.891 PREVIOUS CESAREAN SECTION: Status: ACTIVE | Noted: 2020-03-02

## 2020-03-02 LAB
ABO + RH BLD: NORMAL
BLOOD GROUP ANTIBODIES SERPL: NORMAL
SPECIMEN EXP DATE BLD: NORMAL

## 2020-03-02 PROCEDURE — 36415 COLL VENOUS BLD VENIPUNCTURE: CPT

## 2020-03-02 PROCEDURE — 76010000392 HC C SECN EA ADDL 0.5 HR: Performed by: OBSTETRICS & GYNECOLOGY

## 2020-03-02 PROCEDURE — 74011000250 HC RX REV CODE- 250: Performed by: ANESTHESIOLOGY

## 2020-03-02 PROCEDURE — 74011250636 HC RX REV CODE- 250/636: Performed by: OBSTETRICS & GYNECOLOGY

## 2020-03-02 PROCEDURE — 65410000002 HC RM PRIVATE OB

## 2020-03-02 PROCEDURE — 74011250636 HC RX REV CODE- 250/636: Performed by: NURSE ANESTHETIST, CERTIFIED REGISTERED

## 2020-03-02 PROCEDURE — 77030007866 HC KT SPN ANES BBMI -B: Performed by: ANESTHESIOLOGY

## 2020-03-02 PROCEDURE — 76060000078 HC EPIDURAL ANESTHESIA: Performed by: OBSTETRICS & GYNECOLOGY

## 2020-03-02 PROCEDURE — 74011250636 HC RX REV CODE- 250/636: Performed by: ANESTHESIOLOGY

## 2020-03-02 PROCEDURE — 76010000391 HC C SECN FIRST 1 HR: Performed by: OBSTETRICS & GYNECOLOGY

## 2020-03-02 PROCEDURE — 86900 BLOOD TYPING SEROLOGIC ABO: CPT

## 2020-03-02 PROCEDURE — 75410000003 HC RECOV DEL/VAG/CSECN EA 0.5 HR: Performed by: OBSTETRICS & GYNECOLOGY

## 2020-03-02 RX ORDER — CEFAZOLIN SODIUM/WATER 2 G/20 ML
2 SYRINGE (ML) INTRAVENOUS ONCE
Status: COMPLETED | OUTPATIENT
Start: 2020-03-02 | End: 2020-03-02

## 2020-03-02 RX ORDER — KETOROLAC TROMETHAMINE 30 MG/ML
INJECTION, SOLUTION INTRAMUSCULAR; INTRAVENOUS AS NEEDED
Status: DISCONTINUED | OUTPATIENT
Start: 2020-03-02 | End: 2020-03-02 | Stop reason: HOSPADM

## 2020-03-02 RX ORDER — HYDROCORTISONE ACETATE PRAMOXINE HCL 2.5; 1 G/100G; G/100G
CREAM TOPICAL AS NEEDED
Status: DISCONTINUED | OUTPATIENT
Start: 2020-03-02 | End: 2020-03-02 | Stop reason: SDUPTHER

## 2020-03-02 RX ORDER — HYDROCORTISONE 1 %
CREAM (GRAM) TOPICAL AS NEEDED
Status: DISCONTINUED | OUTPATIENT
Start: 2020-03-02 | End: 2020-03-05 | Stop reason: HOSPADM

## 2020-03-02 RX ORDER — OXYTOCIN/RINGER'S LACTATE 20/1000 ML
PLASTIC BAG, INJECTION (ML) INTRAVENOUS
Status: DISCONTINUED | OUTPATIENT
Start: 2020-03-02 | End: 2020-03-02 | Stop reason: HOSPADM

## 2020-03-02 RX ORDER — MORPHINE SULFATE 2 MG/ML
INJECTION, SOLUTION INTRAMUSCULAR; INTRAVENOUS AS NEEDED
Status: DISCONTINUED | OUTPATIENT
Start: 2020-03-02 | End: 2020-03-02 | Stop reason: HOSPADM

## 2020-03-02 RX ORDER — OXYCODONE AND ACETAMINOPHEN 5; 325 MG/1; MG/1
2 TABLET ORAL
Status: DISCONTINUED | OUTPATIENT
Start: 2020-03-02 | End: 2020-03-05 | Stop reason: HOSPADM

## 2020-03-02 RX ORDER — MORPHINE SULFATE 10 MG/ML
10 INJECTION, SOLUTION INTRAMUSCULAR; INTRAVENOUS
Status: ACTIVE | OUTPATIENT
Start: 2020-03-02 | End: 2020-03-03

## 2020-03-02 RX ORDER — HYDROCORTISONE ACETATE PRAMOXINE HCL 2.5; 1 G/100G; G/100G
CREAM TOPICAL AS NEEDED
Status: DISCONTINUED | OUTPATIENT
Start: 2020-03-02 | End: 2020-03-05 | Stop reason: HOSPADM

## 2020-03-02 RX ORDER — SODIUM CHLORIDE, SODIUM LACTATE, POTASSIUM CHLORIDE, CALCIUM CHLORIDE 600; 310; 30; 20 MG/100ML; MG/100ML; MG/100ML; MG/100ML
125 INJECTION, SOLUTION INTRAVENOUS CONTINUOUS
Status: DISCONTINUED | OUTPATIENT
Start: 2020-03-02 | End: 2020-03-05 | Stop reason: HOSPADM

## 2020-03-02 RX ORDER — SODIUM CHLORIDE, SODIUM LACTATE, POTASSIUM CHLORIDE, CALCIUM CHLORIDE 600; 310; 30; 20 MG/100ML; MG/100ML; MG/100ML; MG/100ML
1000 INJECTION, SOLUTION INTRAVENOUS CONTINUOUS
Status: DISCONTINUED | OUTPATIENT
Start: 2020-03-02 | End: 2020-03-02 | Stop reason: HOSPADM

## 2020-03-02 RX ORDER — ZOLPIDEM TARTRATE 5 MG/1
5 TABLET ORAL
Status: DISCONTINUED | OUTPATIENT
Start: 2020-03-02 | End: 2020-03-05 | Stop reason: HOSPADM

## 2020-03-02 RX ORDER — PHENYLEPHRINE 10 MG/250 ML(40 MCG/ML)IN 0.9 % SOD.CHLORIDE INTRAVENOUS
Status: DISCONTINUED
Start: 2020-03-02 | End: 2020-03-02

## 2020-03-02 RX ORDER — ACETAMINOPHEN 10 MG/ML
INJECTION, SOLUTION INTRAVENOUS AS NEEDED
Status: DISCONTINUED | OUTPATIENT
Start: 2020-03-02 | End: 2020-03-02 | Stop reason: HOSPADM

## 2020-03-02 RX ORDER — SODIUM CHLORIDE 0.9 % (FLUSH) 0.9 %
5-40 SYRINGE (ML) INJECTION EVERY 8 HOURS
Status: DISCONTINUED | OUTPATIENT
Start: 2020-03-02 | End: 2020-03-02 | Stop reason: HOSPADM

## 2020-03-02 RX ORDER — IBUPROFEN 400 MG/1
800 TABLET ORAL EVERY 8 HOURS
Status: DISCONTINUED | OUTPATIENT
Start: 2020-03-02 | End: 2020-03-05 | Stop reason: HOSPADM

## 2020-03-02 RX ORDER — ONDANSETRON 2 MG/ML
INJECTION INTRAMUSCULAR; INTRAVENOUS AS NEEDED
Status: DISCONTINUED | OUTPATIENT
Start: 2020-03-02 | End: 2020-03-02 | Stop reason: HOSPADM

## 2020-03-02 RX ORDER — OXYTOCIN/RINGER'S LACTATE 20/1000 ML
999 PLASTIC BAG, INJECTION (ML) INTRAVENOUS AS NEEDED
Status: DISCONTINUED | OUTPATIENT
Start: 2020-03-02 | End: 2020-03-05 | Stop reason: HOSPADM

## 2020-03-02 RX ORDER — OXYTOCIN/RINGER'S LACTATE 20/1000 ML
PLASTIC BAG, INJECTION (ML) INTRAVENOUS
Status: COMPLETED
Start: 2020-03-02 | End: 2020-03-02

## 2020-03-02 RX ORDER — KETOROLAC TROMETHAMINE 30 MG/ML
30 INJECTION, SOLUTION INTRAMUSCULAR; INTRAVENOUS
Status: DISPENSED | OUTPATIENT
Start: 2020-03-02 | End: 2020-03-03

## 2020-03-02 RX ORDER — DIPHENHYDRAMINE HCL 25 MG
25 CAPSULE ORAL
Status: DISCONTINUED | OUTPATIENT
Start: 2020-03-02 | End: 2020-03-05 | Stop reason: HOSPADM

## 2020-03-02 RX ORDER — SODIUM CHLORIDE 0.9 % (FLUSH) 0.9 %
5-40 SYRINGE (ML) INJECTION AS NEEDED
Status: DISCONTINUED | OUTPATIENT
Start: 2020-03-02 | End: 2020-03-02 | Stop reason: HOSPADM

## 2020-03-02 RX ORDER — SIMETHICONE 80 MG
80 TABLET,CHEWABLE ORAL
Status: DISCONTINUED | OUTPATIENT
Start: 2020-03-02 | End: 2020-03-05 | Stop reason: HOSPADM

## 2020-03-02 RX ORDER — AMMONIA 15 % (W/V)
1 AMPUL (EA) INHALATION AS NEEDED
Status: DISCONTINUED | OUTPATIENT
Start: 2020-03-02 | End: 2020-03-05 | Stop reason: HOSPADM

## 2020-03-02 RX ORDER — OXYCODONE AND ACETAMINOPHEN 5; 325 MG/1; MG/1
1 TABLET ORAL
Status: DISCONTINUED | OUTPATIENT
Start: 2020-03-02 | End: 2020-03-05 | Stop reason: HOSPADM

## 2020-03-02 RX ORDER — MORPHINE SULFATE 0.5 MG/ML
INJECTION, SOLUTION EPIDURAL; INTRATHECAL; INTRAVENOUS
Status: COMPLETED | OUTPATIENT
Start: 2020-03-02 | End: 2020-03-02

## 2020-03-02 RX ORDER — ACETAMINOPHEN 325 MG/1
650 TABLET ORAL
Status: DISCONTINUED | OUTPATIENT
Start: 2020-03-02 | End: 2020-03-05 | Stop reason: HOSPADM

## 2020-03-02 RX ORDER — MORPHINE SULFATE 2 MG/ML
6 INJECTION, SOLUTION INTRAMUSCULAR; INTRAVENOUS
Status: DISCONTINUED | OUTPATIENT
Start: 2020-03-02 | End: 2020-03-05 | Stop reason: HOSPADM

## 2020-03-02 RX ORDER — ONDANSETRON 2 MG/ML
4 INJECTION INTRAMUSCULAR; INTRAVENOUS
Status: DISCONTINUED | OUTPATIENT
Start: 2020-03-02 | End: 2020-03-05 | Stop reason: HOSPADM

## 2020-03-02 RX ORDER — DOCUSATE SODIUM 100 MG/1
100 CAPSULE, LIQUID FILLED ORAL
Status: DISCONTINUED | OUTPATIENT
Start: 2020-03-02 | End: 2020-03-05 | Stop reason: HOSPADM

## 2020-03-02 RX ORDER — BUPIVACAINE HYDROCHLORIDE 7.5 MG/ML
INJECTION, SOLUTION INTRASPINAL
Status: COMPLETED | OUTPATIENT
Start: 2020-03-02 | End: 2020-03-02

## 2020-03-02 RX ORDER — ONDANSETRON 2 MG/ML
4 INJECTION INTRAMUSCULAR; INTRAVENOUS
Status: DISCONTINUED | OUTPATIENT
Start: 2020-03-02 | End: 2020-03-02 | Stop reason: SDUPTHER

## 2020-03-02 RX ORDER — SODIUM CHLORIDE 0.9 % (FLUSH) 0.9 %
5-40 SYRINGE (ML) INJECTION EVERY 8 HOURS
Status: DISCONTINUED | OUTPATIENT
Start: 2020-03-02 | End: 2020-03-05 | Stop reason: HOSPADM

## 2020-03-02 RX ORDER — SODIUM CHLORIDE 0.9 % (FLUSH) 0.9 %
5-40 SYRINGE (ML) INJECTION AS NEEDED
Status: DISCONTINUED | OUTPATIENT
Start: 2020-03-02 | End: 2020-03-05 | Stop reason: HOSPADM

## 2020-03-02 RX ADMIN — ONDANSETRON HYDROCHLORIDE 4 MG: 2 INJECTION, SOLUTION INTRAMUSCULAR; INTRAVENOUS at 07:52

## 2020-03-02 RX ADMIN — KETOROLAC TROMETHAMINE 30 MG: 30 INJECTION, SOLUTION INTRAMUSCULAR; INTRAVENOUS at 09:05

## 2020-03-02 RX ADMIN — KETOROLAC TROMETHAMINE 30 MG: 30 INJECTION, SOLUTION INTRAMUSCULAR at 14:55

## 2020-03-02 RX ADMIN — PHENYLEPHRINE HYDROCHLORIDE 40 MCG/MIN: 10 INJECTION INTRAVENOUS at 08:10

## 2020-03-02 RX ADMIN — SODIUM CHLORIDE, POTASSIUM CHLORIDE, SODIUM LACTATE AND CALCIUM CHLORIDE: 600; 310; 30; 20 INJECTION, SOLUTION INTRAVENOUS at 08:16

## 2020-03-02 RX ADMIN — Medication 999 ML/HR: at 08:34

## 2020-03-02 RX ADMIN — Medication 2 G: at 08:16

## 2020-03-02 RX ADMIN — ACETAMINOPHEN 1000 MG: 10 INJECTION, SOLUTION INTRAVENOUS at 08:50

## 2020-03-02 RX ADMIN — SODIUM CHLORIDE, SODIUM LACTATE, POTASSIUM CHLORIDE, AND CALCIUM CHLORIDE 125 ML/HR: 600; 310; 30; 20 INJECTION, SOLUTION INTRAVENOUS at 13:52

## 2020-03-02 RX ADMIN — MORPHINE SULFATE 0.25 MG: 0.5 INJECTION, SOLUTION EPIDURAL; INTRATHECAL; INTRAVENOUS at 08:08

## 2020-03-02 RX ADMIN — SODIUM CHLORIDE, POTASSIUM CHLORIDE, SODIUM LACTATE AND CALCIUM CHLORIDE: 600; 310; 30; 20 INJECTION, SOLUTION INTRAVENOUS at 07:20

## 2020-03-02 RX ADMIN — MORPHINE SULFATE 2.25 MG: 2 INJECTION, SOLUTION INTRAMUSCULAR; INTRAVENOUS at 08:52

## 2020-03-02 RX ADMIN — BUPIVACAINE HYDROCHLORIDE IN DEXTROSE 12 MG: 7.5 INJECTION, SOLUTION SUBARACHNOID at 08:08

## 2020-03-02 RX ADMIN — MORPHINE SULFATE 2.5 MG: 2 INJECTION, SOLUTION INTRAMUSCULAR; INTRAVENOUS at 08:43

## 2020-03-02 RX ADMIN — KETOROLAC TROMETHAMINE 30 MG: 30 INJECTION, SOLUTION INTRAMUSCULAR at 22:30

## 2020-03-02 NOTE — ROUTINE PROCESS
TRANSFER - IN REPORT:    Verbal report received from [address], RN(name) on [address]  being received from L&D(unit) for routine progression of care      Report consisted of patients Situation, Background, Assessment and   Recommendations(SBAR). Information from the following report(s) SBAR was reviewed with the receiving nurse. Opportunity for questions and clarification was provided. Assessment completed upon patients arrival to unit and care assumed. 1130  Plan of Care and Pt eductation reviewed with patient using [employer] phone, operation ID  [id number]. Pt verbalized understanding.

## 2020-03-02 NOTE — ANESTHESIA PROCEDURE NOTES
Spinal Block    Performed by: Monisha Smith DO  Authorized by: Dara Molina MD     Pre-procedure:   Indications: at surgeon's request, post-op pain management and primary anesthetic  Preanesthetic Checklist: patient identified, risks and benefits discussed, anesthesia consent, site marked, patient being monitored and timeout performed      Spinal Block:   Patient Position:  Seated  Prep Region:  Lumbar  Prep: Betadine and patient draped      Location:  L3-4  Technique:  Single shot        Needle:   Needle Type:  Pencan  Needle Gauge:  24 G  Attempts:  1      Events: CSF confirmed, no blood with aspiration and no paresthesia        Assessment:  Insertion:  Uncomplicated  Patient tolerance:  Patient tolerated the procedure well with no immediate complications

## 2020-03-02 NOTE — H&P
History & Physical    Name: Clarissa Burger MRN: [id number]  SSN: [id number]    YOB: 1990  Age: 27 y.o. Sex: female      Subjective: scheduled C/S     Estimated Date of Delivery: 3/6/20  OB History        5    Para   2    Term   2       0    AB   2    Living   2       SAB   1    TAB   0    Ectopic   1    Molar   0    Multiple        Live Births   2                Ms. Shubham Dobbs admitted with pregnancy at 39w3d for  section due to previous  section. Prenatal course was normal. Please see prenatal records for details. Past Medical History:   Diagnosis Date    Subchorionic hemorrhage in first trimester 2019     Past Surgical History:   Procedure Laterality Date    HX  SECTION      x 2    HX DILATION AND CURETTAGE      post ectopic pregnancy    HX TONSILLECTOMY       Social History     Occupational History    Not on file   Tobacco Use    Smoking status: Never Smoker    Smokeless tobacco: Never Used   Substance and Sexual Activity    Alcohol use: Never     Frequency: Never    Drug use: Never    Sexual activity: Yes     Partners: Female     Birth control/protection: None     Family History   Problem Relation Age of Onset    Diabetes Mother     Thyroid Disease Mother     Hypertension Father        No Known Allergies  Prior to Admission medications    Medication Sig Start Date End Date Taking? Authorizing Provider   sodium chloride (AYR SALINE) 0.65 % drop 2 Drops every two (2) hours as needed. Yes Provider, Historical   prenatal vit-iron fumarate-fa 27 mg iron- 0.8 mg tab tablet Take 1 Tab by mouth daily. 7/15/19  Yes Angie Bradford NP        Review of Systems: Pertinent items are noted in the History of Present Illness. Objective:     Vitals:  Vitals:    20 0645   BP: 114/70   Pulse: 76   Resp: 14   Temp: 98.2 °F (36.8 °C)        Physical Exam:  Patient without distress.   Lung: normal respiratory effort  Abdomen: soft, nontender  Fundus: soft and non tender  Lower Extremities:  - Edema trace BLE  Membranes:  Intact  Fetal Heart Rate: Reactive    Prenatal Labs:   Lab Results   Component Value Date/Time    Rubella, External immune 2019    GrBStrep, External positive in urine 2019    HBsAg, External negative 2019    HIV, External negative 2019        Impression/Plan:     Plan:  Admit for  section. Group B Strep was positive, use of prophylactic antibiotics not indicated. Discussed the risks of surgery including the risks of bleeding, infection, deep vein thrombosis, and surgical injuries to internal organs including but not limited to the bowels, bladder, rectum, and female reproductive organs. The patient understands the risks; any and all questions were answered to the patient's satisfaction.  present.     Signed By:  Lizzeth Schofield MD     2020

## 2020-03-02 NOTE — PROGRESS NOTES
Bedside shift change report given to Sabrina Dandy, RN (oncoming nurse) by RACHELLE Scott RN (offgoing nurse). Report included the following information SBAR.

## 2020-03-02 NOTE — ANESTHESIA PREPROCEDURE EVALUATION
Relevant Problems   No relevant active problems       Anesthetic History   No history of anesthetic complications            Review of Systems / Medical History  Patient summary reviewed, nursing notes reviewed and pertinent labs reviewed    Pulmonary  Within defined limits                 Neuro/Psych   Within defined limits           Cardiovascular  Within defined limits                Exercise tolerance: >4 METS     GI/Hepatic/Renal  Within defined limits              Endo/Other  Within defined limits           Other Findings              Physical Exam    Airway  Mallampati: II  TM Distance: 4 - 6 cm  Neck ROM: normal range of motion   Mouth opening: Normal     Cardiovascular  Regular rate and rhythm,  S1 and S2 normal,  no murmur, click, rub, or gallop             Dental  No notable dental hx       Pulmonary  Breath sounds clear to auscultation               Abdominal  GI exam deferred       Other Findings            Anesthetic Plan    ASA: 2  Anesthesia type: spinal          Induction: Intravenous  Anesthetic plan and risks discussed with: Patient

## 2020-03-02 NOTE — ANESTHESIA POSTPROCEDURE EVALUATION
Procedure(s):   SECTION. spinal    Anesthesia Post Evaluation        Patient location during evaluation: PACU  Note status: Adequate. Level of consciousness: responsive to verbal stimuli and sleepy but conscious  Pain management: satisfactory to patient  Airway patency: patent  Anesthetic complications: no  Cardiovascular status: acceptable  Respiratory status: acceptable  Hydration status: acceptable  Comments: +Post-Anesthesia Evaluation and Assessment    Patient: Marguerite Ramirez MRN: [id number]  SSN: [id number]   YOB: 1990  Age: 27 y.o. Sex: female          Cardiovascular Function/Vital Signs    /58   Pulse 69   Temp 36.8 °C (98.3 °F)   Resp 14   SpO2 100%   Breastfeeding No     Patient is status post Procedure(s) with comments:   SECTION - EDC 3/6/20. Nausea/Vomiting: Controlled. Postoperative hydration reviewed and adequate. Pain:  Pain Scale 1: Numeric (0 - 10) (20)  Pain Intensity 1: 0 (20)   Managed. Neurological Status:   Neuro (WDL): Within Defined Limits (20)   At baseline. Mental Status and Level of Consciousness: Arousable. Pulmonary Status:   O2 Device: Room air (20)   Adequate oxygenation and airway patent. Complications related to anesthesia: None    Post-anesthesia assessment completed. No concerns. I have evaluated the patient and the patient is stable and ready to be discharged from PACU . Signed By: Lindsey Sommer MD    3/2/2020        Vitals Value Taken Time   /58 3/2/2020 10:48 AM   Temp 36.8 °C (98.3 °F) 3/2/2020  9:30 AM   Pulse 67 3/2/2020 10:48 AM   Resp 14 3/2/2020  9:30 AM   SpO2 99 % 3/2/2020 10:20 AM   Vitals shown include unvalidated device data.

## 2020-03-02 NOTE — PROGRESS NOTES
3/2/2020  6:30 AM 26 yo  AT 39.3 WEEKS for repeat  section with Dr. Chepe Murrieta. PAT reviewed and pt changed into gown  0700 PIV and labs  0715 Admission and pre op education completed with  at bedside,  0800 Walked to OR 1 per Dr. Chepe Murrieta and Dr. Sara Quintero. Report to CRNA  0930 Received in recovery, report from Raul 67 REPORT:    Verbal report given to Mansi(name) mallory Singleton  being transferred to [facility] MIU(unit) for routine post - op       Report consisted of patients Situation, Background, Assessment and   Recommendations(SBAR). Information from the following report(s) SBAR, Kardex, Procedure Summary, Intake/Output, MAR, Accordion, Recent Results and Med Rec Status was reviewed with the receiving nurse. Lines:   Peripheral IV 20 Right; Lower Arm (Active)        Opportunity for questions and clarification was provided.       Patient transported with:   Registered Nurse

## 2020-03-02 NOTE — LACTATION NOTE
This note was copied from a baby's chart. Initial Lactation Consultation - Baby born by  this morning to a  mom at 44 3/7 weeks gestation. Mom says she nursed her other children for 2-3 years. She said this baby latched and nursed after delivery. She says she heard him swallow at the breast. Baby was sleeping at the time of my visit. Through the  phone I talked to mom about watching the baby for feeding cues and feeding when he is acting hungry. If baby is sleepy mom can hand express and offer drops of colostrum. She will not limit the time the baby is at the breast. She will allow the baby to completely finish one breast and then offer the second breast at each feeding. Mom will alternate the breast she starts each feeding.

## 2020-03-03 LAB
BASOPHILS # BLD: 0 K/UL (ref 0–0.1)
BASOPHILS NFR BLD: 0 % (ref 0–1)
DIFFERENTIAL METHOD BLD: ABNORMAL
EOSINOPHIL # BLD: 0 K/UL (ref 0–0.4)
EOSINOPHIL NFR BLD: 0 % (ref 0–7)
ERYTHROCYTE [DISTWIDTH] IN BLOOD BY AUTOMATED COUNT: 12.8 % (ref 11.5–14.5)
HCT VFR BLD AUTO: 33.9 % (ref 35–47)
HGB BLD-MCNC: 11.5 G/DL (ref 11.5–16)
IMM GRANULOCYTES # BLD AUTO: 0 K/UL (ref 0–0.04)
IMM GRANULOCYTES NFR BLD AUTO: 0 % (ref 0–0.5)
LYMPHOCYTES # BLD: 1.5 K/UL (ref 0.8–3.5)
LYMPHOCYTES NFR BLD: 16 % (ref 12–49)
MCH RBC QN AUTO: 30.7 PG (ref 26–34)
MCHC RBC AUTO-ENTMCNC: 33.9 G/DL (ref 30–36.5)
MCV RBC AUTO: 90.6 FL (ref 80–99)
MONOCYTES # BLD: 0.7 K/UL (ref 0–1)
MONOCYTES NFR BLD: 7 % (ref 5–13)
NEUTS SEG # BLD: 7.6 K/UL (ref 1.8–8)
NEUTS SEG NFR BLD: 77 % (ref 32–75)
NRBC # BLD: 0 K/UL (ref 0–0.01)
NRBC BLD-RTO: 0 PER 100 WBC
PLATELET # BLD AUTO: 171 K/UL (ref 150–400)
PMV BLD AUTO: 12.3 FL (ref 8.9–12.9)
RBC # BLD AUTO: 3.74 M/UL (ref 3.8–5.2)
WBC # BLD AUTO: 9.9 K/UL (ref 3.6–11)

## 2020-03-03 PROCEDURE — 65410000002 HC RM PRIVATE OB

## 2020-03-03 PROCEDURE — 74011250637 HC RX REV CODE- 250/637: Performed by: OBSTETRICS & GYNECOLOGY

## 2020-03-03 PROCEDURE — 36415 COLL VENOUS BLD VENIPUNCTURE: CPT

## 2020-03-03 PROCEDURE — 74011250636 HC RX REV CODE- 250/636: Performed by: ANESTHESIOLOGY

## 2020-03-03 PROCEDURE — 85025 COMPLETE CBC W/AUTO DIFF WBC: CPT

## 2020-03-03 RX ORDER — OXYTOCIN/RINGER'S LACTATE 20/1000 ML
PLASTIC BAG, INJECTION (ML) INTRAVENOUS
Status: DISCONTINUED | OUTPATIENT
Start: 2020-03-02 | End: 2020-03-03 | Stop reason: HOSPADM

## 2020-03-03 RX ADMIN — IBUPROFEN 800 MG: 400 TABLET, FILM COATED ORAL at 19:55

## 2020-03-03 RX ADMIN — DOCUSATE SODIUM 100 MG: 100 CAPSULE, LIQUID FILLED ORAL at 16:58

## 2020-03-03 RX ADMIN — SIMETHICONE CHEW TAB 80 MG 80 MG: 80 TABLET ORAL at 16:58

## 2020-03-03 RX ADMIN — ACETAMINOPHEN 650 MG: 325 TABLET ORAL at 15:27

## 2020-03-03 RX ADMIN — KETOROLAC TROMETHAMINE 30 MG: 30 INJECTION, SOLUTION INTRAMUSCULAR at 04:30

## 2020-03-03 RX ADMIN — IBUPROFEN 800 MG: 400 TABLET, FILM COATED ORAL at 10:08

## 2020-03-03 RX ADMIN — OXYCODONE HYDROCHLORIDE AND ACETAMINOPHEN 1 TABLET: 5; 325 TABLET ORAL at 23:58

## 2020-03-03 NOTE — LACTATION NOTE
This note was copied from a baby's chart. Infant has been eating well per mom, but mom states infant has been \"stuffy\". I observed infant at breast, deep latch noted. Mom has given infant formula but states infant has a hard time taking the bottle. I suggested that mom continue to provide the breast to ensure adequate milk production. Mom states she has no questions or concerns for lactation at this time.

## 2020-03-03 NOTE — PROGRESS NOTES
Bedside shift change report given to America Muir (oncoming nurse) by Gabriela Stafford (offgoing nurse). Report included the following information SBAR.

## 2020-03-03 NOTE — ADDENDUM NOTE
Addendum  created 03/03/20 1117 by Eleni Reyes CRNA    Intraprocedure Meds edited, Orders acknowledged in Narrator

## 2020-03-03 NOTE — ROUTINE PROCESS
Bedside shift change report given to JONA Torres RN (oncoming nurse) by SILVIANO Santacruz RN (offgoing nurse). Report included the following information SBAR.

## 2020-03-03 NOTE — OP NOTES
Section Operative Report       Patient: Lorna Fuentes MRN: [id number]  SSN: [id number]    YOB: 1990  Age: 27 y.o. Sex: female       Date of Procedure: 3/2/2020     Preoperative Diagnosis: REPEAT     Postoperative Diagnosis: same but delivered      Procedure: Low Transverse Procedure(s):   SECTION    Surgeon(s):  Katia Fernandez MD    Assistant:   Alissa Pratt MD    Anesthesia: spinal    Estimated Blood Loss :  800cc    Findings:   Information for the patient's : Isela James [[id number]]   Delivery of a 3.1 kg female infant on 3/2/2020 at 8:34 AM. Apgars were 9  and 9 . Umbilical Cord:       Umbilical Cord Events: None     Placenta: Expressed removal with Intact appearance. Amniotic Fluid Volume: Moderate     Amniotic Fluid Description:  Clear         Specimens:   ID Type Source Tests Collected by Time Destination   1 :  Placenta   Katia Fernandez MD 3/2/2020 1994 Discarded               Complications:  none    Procedure Details:    After informed consent was obtained, the patient was taken to the operating room, where spinal anesthesia was administered and found to be adequate. Mcclellan catheter was placed using sterile technique. The patient was prepped and draped in the usual sterile fashion. After testing for adequate anesthesia, a Pfannenstiel incision was made and carried to the anterior rectus fascia which was nicked in the midline. This incision in the fascia was extended laterally with curved Dang Scissors. The rectus muscles were  from the fascial sheath with Bovie electrocautery. The muscles were then parted in the midline, the peritoneum was entered bluntly and stretched. The bladder blade was then inserted. The vesicouterine peritoneum was identified and entered sharply with Metzenbaum scissors. The bladder flap was then created sharply and the bladder blade was reinserted.  A low transverse uterine incision was made with the scalpel and extended laterally with blunt finger dissection. Amniotomy was performed. The babys head was then delivered atraumatically followed by the remainder of the body. The nose and mouth were suctioned. The cord was clamped and cut and the baby was handed off to the waiting  staff. The placenta was then extracted from the uterus. The uterus was exteriorized and cleared of all clots and debris using a moist lap sponge. The uterine incision was closed with number 1 Chromic in a running locking fashion with a second layer used to imbricate the incision. The posterior cul-de-sac was irrigated with warm normal saline. Good hemostasis of the hysterotomy was again confirmed and the uterus was returned to the abdomen. Both lateral gutters were irrigated with warm normal saline and good hemostasis was again reassured throughout, including the hysterotomy, bladder flap, rectus muscles and posterior surface of the fascia. The fascia was closed with 0 Vicryl in a running fashion. Good hemostasis was assured. The subcuticular layers were reapproximated with 2-0 plain gut in interrupted fashion. The skin was closed with a 4-0 Vicryl in a subcuticular fashion. The patient tolerated the procedure well. Sponge, lap, and needle counts were correct times three and the patient and baby were taken to recovery/postpartum room in stable condition.     Signed By: Evelin Bennett MD     March 3, 2020

## 2020-03-03 NOTE — PROGRESS NOTES
Post-Operative  Day 1    Sarah Chakraborty         Information for the patient's : Botswanan Dark [[id number]]   , Low Transverse   Patient doing well without unusual complaints. Tolerating liquids, no flatus  Hx via phone     Vitals:  Visit Vitals  BP 97/64 (BP 1 Location: Right arm, BP Patient Position: At rest)   Pulse 76   Temp 98.9 °F (37.2 °C)   Resp 16   SpO2 98%   Breastfeeding Unknown     Temp (24hrs), Av.5 °F (36.9 °C), Min:97.3 °F (36.3 °C), Max:98.9 °F (37.2 °C)      Last 24hr Input/Output:    Intake/Output Summary (Last 24 hours) at 3/3/2020 0808  Last data filed at 3/2/2020 1800  Gross per 24 hour   Intake 1200 ml   Output 1615 ml   Net -415 ml          Exam:       Patient without distress. Abdomen:soft, expected tenderness, fundus firm, wound dressing intact     Lower extremities are nontender without edema.  No cords    Labs:   Lab Results   Component Value Date/Time    WBC 9.9 2020 04:34 AM    WBC 10.6 2020 10:18 AM    WBC 9.9 2019 09:17 PM    WBC 10.3 07/15/2019 03:55 PM    HGB 11.5 2020 04:34 AM    HGB 13.7 2020 10:18 AM    HGB 12.1 2019 09:17 PM    HGB 13.2 07/15/2019 03:55 PM    HCT 33.9 (L) 2020 04:34 AM    HCT 40.5 2020 10:18 AM    HCT 36.6 2019 09:17 PM    HCT 41.1 07/15/2019 03:55 PM    PLATELET 878 76/15/5541 04:34 AM    PLATELET 269  10:18 AM    PLATELET 243  09:17 PM    PLATELET 729  03:55 PM       Recent Results (from the past 24 hour(s))   CBC WITH AUTOMATED DIFF    Collection Time: 20  4:34 AM   Result Value Ref Range    WBC 9.9 3.6 - 11.0 K/uL    RBC 3.74 (L) 3.80 - 5.20 M/uL    HGB 11.5 11.5 - 16.0 g/dL    HCT 33.9 (L) 35.0 - 47.0 %    MCV 90.6 80.0 - 99.0 FL    MCH 30.7 26.0 - 34.0 PG    MCHC 33.9 30.0 - 36.5 g/dL    RDW 12.8 11.5 - 14.5 %    PLATELET 221 730 - 802 K/uL    MPV 12.3 8.9 - 12.9 FL    NRBC 0.0 0  WBC    ABSOLUTE NRBC 0.00 0.00 - 0.01 K/uL    NEUTROPHILS 77 (H) 32 - 75 %    LYMPHOCYTES 16 12 - 49 %    MONOCYTES 7 5 - 13 %    EOSINOPHILS 0 0 - 7 %    BASOPHILS 0 0 - 1 %    IMMATURE GRANULOCYTES 0 0.0 - 0.5 %    ABS. NEUTROPHILS 7.6 1.8 - 8.0 K/UL    ABS. LYMPHOCYTES 1.5 0.8 - 3.5 K/UL    ABS. MONOCYTES 0.7 0.0 - 1.0 K/UL    ABS. EOSINOPHILS 0.0 0.0 - 0.4 K/UL    ABS. BASOPHILS 0.0 0.0 - 0.1 K/UL    ABS. IMM. GRANS. 0.0 0.00 - 0.04 K/UL    DF AUTOMATED             Assessment: Post-Op day 1, stable  A+/RI. Girl    Plan:   1.  Routine post-operative care

## 2020-03-04 PROCEDURE — 65410000002 HC RM PRIVATE OB

## 2020-03-04 PROCEDURE — 74011250637 HC RX REV CODE- 250/637: Performed by: OBSTETRICS & GYNECOLOGY

## 2020-03-04 RX ADMIN — SIMETHICONE CHEW TAB 80 MG 80 MG: 80 TABLET ORAL at 10:58

## 2020-03-04 RX ADMIN — ACETAMINOPHEN 650 MG: 325 TABLET ORAL at 11:05

## 2020-03-04 RX ADMIN — IBUPROFEN 800 MG: 400 TABLET, FILM COATED ORAL at 22:33

## 2020-03-04 RX ADMIN — IBUPROFEN 800 MG: 400 TABLET, FILM COATED ORAL at 04:27

## 2020-03-04 RX ADMIN — ACETAMINOPHEN 650 MG: 325 TABLET ORAL at 17:10

## 2020-03-04 RX ADMIN — IBUPROFEN 800 MG: 400 TABLET, FILM COATED ORAL at 14:42

## 2020-03-04 NOTE — PROGRESS NOTES
1530: Bedside and Verbal shift change report given to Wyatt Cassidy (oncoming nurse) by Yulia Bravo RN (offgoing nurse). Report included the following information SBAR.

## 2020-03-04 NOTE — ROUTINE PROCESS
1600:  Bedside and Verbal shift change report given to Leo Mcburney, RN  (oncoming nurse) by Chadd Rice RN (offgoing nurse). Report included the following information SBAR.

## 2020-03-04 NOTE — PROGRESS NOTES
Post-Operative  Day 2    Sarah Chakraborty         Information for the patient's : Eligio Zamorano [[id number]]   , Low Transverse   Patient doing well without unusual complaints. Tolerating regular diet, ambulating without difficulty  Voiding spontaneously  Lochia =menses  Friend in room used to translate      Vitals:  Visit Vitals  /67 (BP 1 Location: Left arm, BP Patient Position: At rest)   Pulse 84   Temp 97.9 °F (36.6 °C)   Resp 16   SpO2 98%   Breastfeeding Unknown     Temp (24hrs), Av.1 °F (36.7 °C), Min:97.9 °F (36.6 °C), Max:98.3 °F (36.8 °C)      Last 24hr Input/Output:  No intake or output data in the 24 hours ending 20 0736       Exam:       Patient without distress. Abdomen:soft, expected tenderness, fundus firm, wound dressing intact     Lower extremities are nontender without edema. No cords    Labs:   Lab Results   Component Value Date/Time    WBC 9.9 2020 04:34 AM    WBC 10.6 2020 10:18 AM    WBC 9.9 2019 09:17 PM    WBC 10.3 07/15/2019 03:55 PM    HGB 11.5 2020 04:34 AM    HGB 13.7 2020 10:18 AM    HGB 12.1 2019 09:17 PM    HGB 13.2 07/15/2019 03:55 PM    HCT 33.9 (L) 2020 04:34 AM    HCT 40.5 2020 10:18 AM    HCT 36.6 2019 09:17 PM    HCT 41.1 07/15/2019 03:55 PM    PLATELET 661  04:34 AM    PLATELET 155  10:18 AM    PLATELET 735  09:17 PM    PLATELET 351  03:55 PM       No results found for this or any previous visit (from the past 24 hour(s)). Assessment: Post-Op day 2 stable  A+/RI. Girl    Plan:   1. Routine post-operative care  2.  Discharge tomorrow  Eusebia Jeronimo MD   3/4/2020  7:38 AM

## 2020-03-04 NOTE — ROUTINE PROCESS
Bedside shift change report given to Basil Manrique RN (oncoming nurse) by Hattie Porras. Rufina Parnell (offgoing nurse). Report included the following information SBAR, Kardex, Procedure Summary, Intake/Output, MAR and Recent Results.

## 2020-03-04 NOTE — LACTATION NOTE
This note was copied from a baby's chart. Infant fussy and rooting at the time of my visit. She is frustrated at breast. She will take a few sucks and then come off fussing. I manually expressed 5 ml of EBM and spoon/syringe fed it to the infant, then latched her to the breast. She was more consistent at the breast at this time, maintaining the latch. Infant had been given formula from the bottle previously. I discussed (via blue phone) with mom that formula comes out of the bottle easily and provides instant satisfaction to the infant, whereas she has to work at the breast to get the milk to flow. Mom has plenty of colostrum for infant that is easily expressed. I suggested that she only put infant to the breast so that infant does not get confused and her milk supply isn't impacted. She acknowledges understanding. Handouts provided to mom (in Kinyarwanda) regarding breastfeeding/engorgement information. She will review them and let us know if she has any questions.

## 2020-03-05 VITALS
TEMPERATURE: 98.6 F | DIASTOLIC BLOOD PRESSURE: 71 MMHG | SYSTOLIC BLOOD PRESSURE: 119 MMHG | RESPIRATION RATE: 16 BRPM | OXYGEN SATURATION: 98 % | HEART RATE: 74 BPM

## 2020-03-05 PROCEDURE — 74011250637 HC RX REV CODE- 250/637: Performed by: OBSTETRICS & GYNECOLOGY

## 2020-03-05 RX ORDER — IBUPROFEN 800 MG/1
800 TABLET ORAL
Qty: 30 TAB | Refills: 0 | Status: SHIPPED | OUTPATIENT
Start: 2020-03-05 | End: 2020-05-22 | Stop reason: ALTCHOICE

## 2020-03-05 RX ADMIN — IBUPROFEN 800 MG: 400 TABLET, FILM COATED ORAL at 08:34

## 2020-03-05 NOTE — DISCHARGE SUMMARY
Obstetrical Discharge Summary     Name: Mitzi Shepherd MRN: [id number]  SSN: [id number]    YOB: 1990  Age: 27 y.o. Sex: female      Allergies: Patient has no known allergies. Admit Date: 3/2/2020    Discharge Date: 3/5/2020     Admitting Physician: Emilie Cranker, MD     Attending Physician:  Dany Garnica MD     * Admission Diagnoses: Previous  section [O57.257]    * Discharge Diagnoses:   Information for the patient's : America Garay [[id number]]   Delivery of a 3.1 kg female infant via , Low Transverse on 3/2/2020 at 8:34 AM  by Emilie Cranker. Apgars were 9  and 9 . Additional Diagnoses:   Hospital Problems as of 3/5/2020 Date Reviewed: 2020          Codes Class Noted - Resolved POA    Previous  section ICD-10-CM: Z98.891  ICD-9-CM: V45.89  3/2/2020 - Present Unknown             Lab Results   Component Value Date/Time    ABO/Rh(D) A POSITIVE 2020 07:00 AM    Rubella, External immune 2019    GrBStrep, External positive in urine 2019    ABO,Rh A POSITIVE 2019    There is no immunization history for the selected administration types on file for this patient. * Procedures: LTCS on 3/2/2020. Girl. Procedure(s) with comments:   SECTION - EDC 3/6/20           * Discharge Condition: good    [facility] Course: Normal hospital course following the delivery. * Disposition: Home    Discharge Medications:   Current Discharge Medication List      START taking these medications    Details   ibuprofen (MOTRIN) 800 mg tablet Take 1 Tab by mouth every eight (8) hours as needed for Pain. Qty: 30 Tab, Refills: 0         CONTINUE these medications which have NOT CHANGED    Details   prenatal vit-iron fumarate-fa 27 mg iron- 0.8 mg tab tablet Take 1 Tab by mouth daily.   Qty: 30 Tab, Refills: 3    Associated Diagnoses: Pregnancy, unspecified gestational age         STOP taking these medications       sodium chloride (AYR SALINE) 0.65 % drop Comments:   Reason for Stopping:               * Follow-up Care/Patient Instructions: Activity: no sex, douching, tampons, bath/pool x 6 weeks ,no heavy lifting x 8 weeks. No driving x 2 weeks.    Diet: Regular Diet  Wound Care: Keep wound clean and dry    Follow-up Information     Follow up With Specialties Details Why Contact Info    Breastfeeding Support Group Lactation Services   Meets 1st And 3rd Tuesday Each Month From 10:00-11:00  [address] [facility] [place] [id number]    Postpartum Support Group Social and Spiritual Support   Meets 1st And 3rd Saturdays Of Each Month 9:00am-10:30  [address] [facility](behind [facility])  [facility], [address] [address], NP Family Practice   [address] [facility]  [phone]      Madhu Carmona 7 Gynecology, Gynecology, Obstetrics In 2 weeks  [address]

## 2020-03-05 NOTE — LACTATION NOTE
This note was copied from a baby's chart. Not seen at breast, mother declines [facility] consult, expresses confidence in ability to breastfeed independently. Mother continues to give formula and offer breast some. This is what she did with her other children and wants to the same this time. Mother states that she has no further questions for Lactation Consultant before discharge.

## 2020-03-05 NOTE — ROUTINE PROCESS
0800: Bedside and Verbal shift change report given to Jered Guardado RN  (oncoming nurse) by Jed Broderick RN (offgoing nurse). Report included the following information SBAR.

## 2020-03-05 NOTE — PROGRESS NOTES
Post-Operative Day Number 3 Progress/Discharge Note    Patient doing well post-op day 3 from  delivery without significant complaints. Pain controlled on current medication. Voiding without difficulty, normal lochia. Denies cp/sob/n/v. Ambulating without problem. Used  phone. #[id number]    Vitals:    Patient Vitals for the past 8 hrs:   BP Temp Pulse Resp   20 0330 119/71 98.6 °F (37 °C) 74 16     Temp (24hrs), Av.6 °F (37 °C), Min:98.4 °F (36.9 °C), Max:98.8 °F (37.1 °C)      Vital signs stable, afebrile. Exam:  Patient without distress. Heart regular rate and rhythm   Lungs CTA b/l               Abdomen soft, fundus firm at level of umbilicus, non tender. Incision dry and clean without erythema. Lower extremities are negative for swelling, cords or tenderness. Lab/Data Review:  no new labs    Assessment and Plan:  Patient appears to be having uncomplicated post- course. Continue routine post-op care and maternal education. Plan discharge for today with follow up in our office in 2 weeks. Girl. Benign labs.

## 2020-03-05 NOTE — DISCHARGE INSTRUCTIONS
POSTPARTUM DISCHARGE INSTRUCTIONS       Name:  Ivanna Nichols  YOB: 1990  Admission Diagnosis:  Previous  section [Z98.891]     Discharge Diagnosis:    Problem List as of 3/5/2020 Date Reviewed: 2020          Codes Class Noted - Resolved    Previous  section ICD-10-CM: Z98.891  ICD-9-CM: V45.89  3/2/2020 - Present            Attending Physician:  Best Solis MD    Delivery Type:   Section: What to Expect at Home    Your Recovery:  A  section, or , is surgery to deliver your baby through a cut, called an incision that the doctor makes in your lower belly and uterus. You may have some pain in your lower belly and need pain medicine for 1 to 2 weeks. You can expect some vaginal bleeding for several weeks. You will probably need about 6 weeks to fully recover. It is important to take it easy while the incision is healing. Avoid heavy lifting, strenuous activities, or exercises that strain the belly muscles while you are recovering. Ask a family member or friend for help with housework, cooking, and shopping. This care sheet gives you a general idea about how long it will take for you to recover. But each person recovers at a different pace. Follow the steps below to get better as quickly as possible. How can you care for yourself at home? Activity  · Rest when you feel tired. Getting enough sleep will help you recover. · Try to walk each day. Start by walking a little more than you did the day before. Bit by bit, increase the amount you walk. Walking boosts blood flow and helps prevent pneumonia, constipation, and blood clots. · Avoid strenuous activities, such as bicycle riding, jogging, weightlifting, and aerobic exercise, for 6 weeks or until your doctor says it is okay. · Until your doctor says it is okay, do not lift anything heavier than your baby.   · Do not do sit-ups or other exercise that strain the belly muscles for 6 weeks or until your doctor says it is okay. · Hold a pillow over your incision when you cough or take deep breaths. This will support your belly and decrease your pain. · You may shower as usual. Pat the incision dry when you are done. · You will have some vaginal bleeding. Wear sanitary pads. Do not douche or use tampons until your doctor says it is okay. · Ask your doctor when you can drive again. · You will probably need to take at least 6 weeks off work. It depends on the type of work you do and how you feel. · Wait until you are healed (about 4 to 6 weeks) before you have sexual intercourse. Your doctor will tell you when it is okay to have sex. · Talk to your doctor about birth control. You can get pregnant even before your period returns. Also, you can get pregnant while you are breast-feeding. Incision care  Your skin is your body's first line of defense against germs, but an incision site leaves an easy way for germs to enter your body. To prevent infection:  · Clean your hands frequently and before and after changing any touching any dressings. · If you have strips of tape on the incision, leave the tape on for a week or until it falls off. · Look at your incision closely every day for any changes. Contact your doctor if you experience any signs of infection, such as fever or increased redness at the surgical site. · Wash the area daily with warm, soapy water, and pat it dry. Don't use hydrogen peroxide or alcohol, which can slow healing. You may cover the area with a gauze bandage if it weeps or rubs against clothing. Change the bandage every day. · Keep the area clean and dry. Diet  · You can eat your normal diet. If your stomach is upset, try bland, low-fat foods like plain rice, broiled chicken, toast, and yogurt. · Drink plenty of fluids (unless your doctor tells you not to). · You may notice that your bowel movements are not regular right after your surgery. This is common.  Try to avoid constipation and straining with bowel movements. You may want to take a fiber supplement every day. If you have not had a bowel movement after a couple of days, ask your doctor about taking a mild laxative. · If you are breast-feeding, do not drink any alcohol. Medicines  · Take pain medicines exactly as directed. · If the doctor gave you a prescription medicine for pain, take it as prescribed. · If you are not taking a prescription pain medicine, ask your doctor if you can take an over-the-counter medicine such as acetaminophen (Tylenol), ibuprofen (Advil, Motrin), or naproxen (Aleve), for cramps. Read and follow all instructions on the label. Do not take aspirin, because it can cause more bleeding. Do not take acetaminophen (Tylenol) and other acetaminophen containing medications (i.e. Percocet) at the same time. · If you think your pain medicine is making you sick to your stomach:  · Take your medicine after meals (unless your doctor has told you not to). · Ask your doctor for a different pain medicine. · If your doctor prescribed antibiotics, take them as directed. Do not stop taking them just because you feel better. You need to take the full course of antibiotics. Mental Health  · Many women get the \"baby blues\" during the first few days after childbirth. You may lose sleep, feel irritable, and cry easily. You may feel happy one minute and sad the next. Hormone changes are one cause of these emotional changes. Also, the demands of a new baby, along with visits from relatives or other family needs, add to a mother's stress. The \"baby blues\" often peak around the fourth day. Then they ease up in less than 2 weeks. · If your moodiness or anxiety lasts for more than 2 weeks, or if you feel like life is not worth living, you may have postpartum depression. This is different for each mother. Some mothers with serious depression may worry intensely about their infant's well-being.  Others may feel distant from their child. Some mothers might even feel that they might harm their baby. A mother may have signs of paranoia, wondering if someone is watching her. · With all the changes in your life, you may not know if you are depressed. Pregnancy sometimes causes changes in how you feel that are similar to the symptoms of depression. · Symptoms of depression include:  · Feeling sad or hopeless and losing interest in daily activities. These are the most common symptoms of depression. · Sleeping too much or not enough. · Feeling tired. You may feel as if you have no energy. · Eating too much or too little. · POSTPARTUM SUPPORT INTERNATIONAL (PSI) offers a Warm line; Chat with the Expert phone sessions; Information and Articles about Pregnancy and Postpartum Mood Disorders; Comprehensive List of Free Support Groups; Knowledgeable local coordinators who will offer support, information, and resources; Guide to Resources on [employer]; Calendar of events in the  mood disorders community; Latest News and Research; and [facility] [address] for [employer]. Remember - You are not alone; You are not to blame; With help, you will be well. 3-306-956-PPD(1968). WWW. POSTPARTUM. NET   · Writing or talking about death, such as writing suicide notes or talking about guns, knives, or pills. Keep the numbers for these national suicide hotlines: 0-310-130-TALK (4-515.137.4361) and 5-187-JOIPPUR (8-824.945.2304). If you or someone you know talks about suicide or feeling hopeless, get help right away. Other instructions  · If you breast-feed your baby, you may be more comfortable while you are healing if you place the baby so that he or she is not resting on your belly. Try tucking your baby under your arm, with his or her body along the side you will be feeding on. Support your baby's upper body with your arm.  With that hand you can control your baby's head to bring his or her mouth to your breast. This is sometimes called the football hold. Follow-up care is a key part of your treatment and safety. Be sure to make and go to all appointments, and call your doctor if you are having problems. It's also a good idea to know your test results and keep a list of the medicines you take. When should you call for help? Call 911 anytime you think you may need emergency care. For example, call if:  · You are thinking of hurting yourself, your baby, or anyone else. · You passed out (lost consciousness). · You have symptoms of a blood clot in your lung (called a pulmonary embolism). These may include:  · Sudden chest pain. · Trouble breathing. · Coughing up blood. Call your doctor now or seek immediate medical care if:    · You have severe vaginal bleeding. · You are soaking through a pad each hour for 2 or more hours. · Your vaginal bleeding seems to be getting heavier or is still bright red 4 days after delivery. · You are dizzy or lightheaded, or you feel like you may faint. · You are vomiting or cannot keep fluids down. · You have a fever. · You have new or more belly pain. · You have loose stitches, or your incision comes open. · You have symptoms of infection, such as:  · Increased pain, swelling, warmth, or redness. · Red streaks leading from the incision. · Pus draining from the incision. · A fever  · You pass tissue (not just blood). · Your vaginal discharge smells bad. · Your belly feels tender or full and hard. · Your breasts are continuously painful or red. · You feel sad, anxious, or hopeless for more than a few days. · You have sudden, severe pain in your belly. · You have symptoms of a blood clot in your leg (called a deep vein thrombosis), such as:  · Pain in your calf, back of the knee, thigh, or groin. · Redness and swelling in your leg or groin. · You have symptoms of preeclampsia, such as:  · Sudden swelling of your face, hands, or feet.   · New vision problems (such as dimness or blurring). · A severe headache. · Your blood pressure is higher than it should be or rises suddenly. · You have new nausea or vomiting. Watch closely for changes in your health, and be sure to contact your doctor if you have any problems. Additional Information:  Preventing Infection at Home    We care about preventing infection and avoiding the spread of germs - not only when you are in the hospital but also when you return home. When you return home from the hospital, it's important to take the following steps to help prevent infection and avoid spreading germs that could infect you and others. Ask everyone in your home to follow these guidelines, too. Clean Your Hands  · Clean your hands whenever your hands are visibly dirty, before you eat, before or after touching your mouth, nose or eyes, and before preparing food. Clean them after contact with body fluids, using the restroom, touching animals or changing diapers. · When washing hands, wet them with warm water and work up a lather. Rub hands for at least 15 seconds, then rinse them and pat them dry with a clean towel or paper towel. · When using hand sanitizers, it should take about 15 seconds to rub your hands dry. If not, you probably didn't apply enough . Cover Your Sneeze or Cough  Germs are released into the air whenever you sneeze or cough. To prevent the spread of infection:  · Turn away from other people before coughing or sneezing. · Cover your mouth or nose with a tissue when you cough or sneeze. Put the tissue in the trash. · If you don't have a tissue, cough or sneeze into your upper sleeve, not your hands. · Always clean your hands after coughing or sneezing. Care for Wounds  Your skin is your body's first line of defense against germs, but an open wound leaves an easy way for germs to enter your body.  To prevent infection:  · Clean your hands before and after changing wound dressings, and wear gloves to change dressings if recommended by your doctor. · Take special care with IV lines or other devices inserted into the body. If you must touch them, clean your hands first.  · Follow any specific instructions from your doctor to care for your wounds. Contact your doctor if you experience any signs of infection, such as fever or increased redness at the surgical or wound site. Keep a Clean Home  · Clean or wipe commonly touched hard surfaces like door handles, sinks, tabletops, phones and TV remotes. · Use products labeled \"disinfectant\" to kill harmful bacteria and viruses. · Use a clean cloth or paper towel to clean and dry surfaces. Wiping surfaces with a dirty dishcloth, sponge or towel will only spread germs. · Never share toothbrushes, hinkle, drinking glasses, utensils, razor blades, face cloths or bath towels to avoid spreading germs. · Be sure that the linens that you sleep on are clean. · Keep pets away from wounds and wash your hands after touching pets, their toys or bedding. We care about you and your health. Remember, preventing infections is a   team effort between you, your family, friends and health care providers. Postpartum Support    PARENTS:  Are you feeling sad or depressed? Is it difficult for you to enjoy yourself? Do you feel more irritable or tense? Do you feel anxious or panicky? Are you having difficulty bonding with your baby? Do you feel as if you are \"out of control\" or \"going crazy\"? Are you worried that you might hurt your baby or yourself? FAMILIES: Do you worry that something is wrong but don't know how to help? Do you think that your partner or spouse is having problems coping? Are you worried that it may never get better? While many women experience some mild mood change or \"the blues\" during or after the birth of a child, 1 in 9 women experience more significant symptoms of depression or anxiety.   1 in 10 Dads become depressed during the first year.    Things you can do  Being a good parent includes taking care of yourself. If you take care of yourself, you will be able to take better care of your baby and your family. · Talk to a counselor or healthcare provider who has training in  mood and anxiety problems. · Learn as much as you can about pregnancy and postpartum depression and anxiety. · Get support from family and friends. Ask for help when you need it. · Join a support group in your area or online. · Keep active by walking, stretching or whatever form of exercise helps you to feel better. · Get enough rest and time for yourself. · Eat a healthy diet. · Don't give up! It may take more than one try to get the right help you need. These are general instructions for a healthy lifestyle:    No smoking/ No tobacco products/ Avoid exposure to second hand smoke    Surgeon General's Warning:  Quitting smoking now greatly reduces serious risk to your health. Obesity, smoking, and sedentary lifestyle greatly increases your risk for illness    A healthy diet, regular physical exercise & weight monitoring are important for maintaining a healthy lifestyle    Recognize signs and symptoms of STROKE:    F-face looks uneven    A-arms unable to move or move unevenly    S-speech slurred or non-existent    T-time-call 911 as soon as signs and symptoms begin - DO NOT go       back to bed or wait to see if you get better - TIME IS BRAIN. I have had the opportunity to make my options or choices for discharge. I have received and understand these instructions.

## 2020-03-05 NOTE — ROUTINE PROCESS
Bedside shift change report given to Purvi Stoner RN (oncoming nurse) by Deana Peralta RN (offgoing nurse). Report included the following information SBAR.

## 2020-05-22 ENCOUNTER — VIRTUAL VISIT (OUTPATIENT)
Dept: INTERNAL MEDICINE CLINIC | Age: 30
End: 2020-05-22

## 2020-05-22 VITALS — BODY MASS INDEX: 26.63 KG/M2 | WEIGHT: 165 LBS

## 2020-05-22 DIAGNOSIS — E01.0 THYROMEGALY: ICD-10-CM

## 2020-05-22 DIAGNOSIS — M54.2 CHRONIC CERVICAL PAIN: Primary | ICD-10-CM

## 2020-05-22 DIAGNOSIS — G89.29 CHRONIC CERVICAL PAIN: Primary | ICD-10-CM

## 2020-05-22 RX ORDER — CYCLOBENZAPRINE HCL 5 MG
5 TABLET ORAL
Qty: 30 TAB | Refills: 0 | Status: SHIPPED | OUTPATIENT
Start: 2020-05-22 | End: 2020-06-29

## 2020-05-22 RX ORDER — ACETAMINOPHEN AND CODEINE PHOSPHATE 120; 12 MG/5ML; MG/5ML
SOLUTION ORAL
COMMUNITY
Start: 2020-05-09 | End: 2021-03-10

## 2020-05-22 RX ORDER — NAPROXEN 500 MG/1
500 TABLET ORAL
Qty: 40 TAB | Refills: 0 | Status: SHIPPED | OUTPATIENT
Start: 2020-05-22 | End: 2020-06-29

## 2020-05-22 NOTE — PROGRESS NOTES
madison [id number]    Chief Complaint   Patient presents with    Neck Pain     back of neck pain and radiates to shoulders bilaterally. no known injury. x3 years on going, but worsening     1. Have you been to the ER, urgent care clinic since your last visit? Hospitalized since your last visit? Yes birth of baby , [facility] , 3-2-2020    2. Have you seen or consulted any other health care providers outside of the [address] [place] [address] since your last visit? Include any pap smears or colon screening. No     Health Maintenance Due   Topic Date Due    DTaP/Tdap/Td series (1 - Tdap) 2011    PAP AKA CERVICAL CYTOLOGY  2011     3 most recent PHQ Screens 7/15/2019   Little interest or pleasure in doing things Not at all   Feeling down, depressed, irritable, or hopeless Not at all   Total Score PHQ 2 0     Recent Travel Screening and Travel History documentation     Travel Screening       Question Response     In the last month, have you been in contact with someone who was confirmed or suspected to have Coronavirus / COVID-19? No / Unsure     Do you have any of the following symptoms? None of these     Have you traveled internationally in the last month?  No      Travel History   Travel since 20     No documented travel since 20

## 2020-05-25 NOTE — PROGRESS NOTES
Sampson Palafox is a 27 y.o. female presents for acute care. [employer]   Malay speaking  ID# [id number] facilitates   Consent: Gaudencio Lowry, who was seen by synchronous (real-time) audio-video technology, and/or her healthcare decision maker, is aware that this patient-initiated, Telehealth encounter on 2020 is a billable service, with coverage as determined by her insurance carrier. She is aware that she may receive a bill and has provided verbal consent to proceed: Yes. HPI   Patient reports cervical neck pain radiating to both shoulder and upper back for 3 years. Pain increase with lifting. Denies injury or unusual activity. Use muscle relaxant prior   Evaluated in [facility] 11/2 years ago, had xray; no specific diagnosis   She is lactating     She had episodic choking and tightness anterior neck. Did not get thyroid ultrasound. Requests another order        Past Medical History:   Diagnosis Date    Subchorionic hemorrhage in first trimester 2019     Current Outpatient Medications   Medication Sig    norethindrone (MICRONOR) 0.35 mg tab TAKE 1 TABLET BY MOUTH EVERY DAY    naproxen (NAPROSYN) 500 mg tablet Take 1 Tab by mouth two (2) times daily as needed for Pain.  cyclobenzaprine (FLEXERIL) 5 mg tablet Take 1 Tab by mouth three (3) times daily as needed for Muscle Spasm(s).  prenatal vit-iron fumarate-fa 27 mg iron- 0.8 mg tab tablet Take 1 Tab by mouth daily. No current facility-administered medications for this visit. No Known Allergies     Past Surgical History:   Procedure Laterality Date    HX  SECTION      x 2    HX DILATION AND CURETTAGE      post ectopic pregnancy    HX TONSILLECTOMY       Family History   Problem Relation Age of Onset    Diabetes Mother     Thyroid Disease Mother     Hypertension Father      Review of Systems   Constitutional: Negative. Respiratory: Negative. Cardiovascular: Negative.     Gastrointestinal: Negative. Genitourinary: Negative. Musculoskeletal: Positive for back pain, myalgias and neck pain. Negative for falls. Skin: Negative. Objective  Visit Vitals  Wt 165 lb (74.8 kg)   LMP 05/18/2020 (Exact Date)   Breastfeeding Yes   BMI 26.63 kg/m²     Physical Exam  Constitutional:       General: She is not in acute distress. Appearance: Normal appearance. Neurological:      Mental Status: She is alert. Psychiatric:         Mood and Affect: Mood normal.         Behavior: Behavior normal.         Thought Content: Thought content normal.         Judgment: Judgment normal.          Assessment & Plan    ICD-10-CM ICD-9-CM    1. Chronic cervical pain M54.2 723.1 XR SPINE CERV 4 OR 5 V    G89.29 338.29 naproxen (NAPROSYN) 500 mg tablet      cyclobenzaprine (FLEXERIL) 5 mg tablet   2. Thyromegaly E01.0 240.9 US THYROID/PARATHYROID/SOFT TISS         lab results and schedule of future lab studies reviewed with patient  reviewed medications and side effects in detail    Patient voices understanding and acceptance of this advice and will call back if any further questions or concerns.   Ruthie Kidney, NP

## 2020-06-02 ENCOUNTER — HOSPITAL ENCOUNTER (OUTPATIENT)
Dept: ULTRASOUND IMAGING | Age: 30
Discharge: HOME | End: 2020-06-02
Attending: NURSE PRACTITIONER
Payer: MEDICAID

## 2020-06-02 ENCOUNTER — HOSPITAL ENCOUNTER (OUTPATIENT)
Dept: GENERAL RADIOLOGY | Age: 30
Discharge: HOME | End: 2020-06-02
Attending: NURSE PRACTITIONER
Payer: MEDICAID

## 2020-06-02 DIAGNOSIS — E01.0 THYROMEGALY: ICD-10-CM

## 2020-06-02 DIAGNOSIS — G89.29 CHRONIC CERVICAL PAIN: ICD-10-CM

## 2020-06-02 DIAGNOSIS — M54.2 CHRONIC CERVICAL PAIN: ICD-10-CM

## 2020-06-02 PROCEDURE — 72050 X-RAY EXAM NECK SPINE 4/5VWS: CPT

## 2020-06-02 PROCEDURE — 76536 US EXAM OF HEAD AND NECK: CPT

## 2020-06-26 ENCOUNTER — VIRTUAL VISIT (OUTPATIENT)
Dept: INTERNAL MEDICINE CLINIC | Age: 30
End: 2020-06-26

## 2020-06-26 DIAGNOSIS — M54.2 CERVICAL PAIN (NECK): ICD-10-CM

## 2020-06-26 DIAGNOSIS — E04.1 LEFT THYROID NODULE: Primary | ICD-10-CM

## 2020-06-26 DIAGNOSIS — L65.9 HAIR LOSS: ICD-10-CM

## 2020-06-29 NOTE — PROGRESS NOTES
Jeferson Naylor is a 27 y.o. female presents for acute care   Zayra Ma is a 27 y.o. female evaluated via audio only technology on 2020. [employer] Yakut language   ID# [id number] facilitates     Consent: She and/or her health care decision maker is aware that she may receive a bill for this audio only encounter, depending on her insurance coverage, and has provided verbal consent to proceed: Yes    I communicated with the patient and/or health care decision maker about the nature and details of the followin  Subjective:   Zayra Ma is a 27 y.o. female who was seen for Results (per pt wants to discuss results from XRAY and ultrasound 20 ) and Alopecia (increased hair loss. pt reports it started after recent birth of child. x3 months )      Prior to Admission medications    Medication Sig Start Date End Date Taking? Authorizing Provider   norethindrone (MICRONOR) 0.35 mg tab TAKE 1 TABLET BY MOUTH EVERY DAY 20  Yes Provider, Historical   prenatal vit-iron fumarate-fa 27 mg iron- 0.8 mg tab tablet Take 1 Tab by mouth daily. 7/15/19  Yes Primus Gallus, NP   naproxen (NAPROSYN) 500 mg tablet Take 1 Tab by mouth two (2) times daily as needed for Pain. 20  Primus Gallus, NP   cyclobenzaprine (FLEXERIL) 5 mg tablet Take 1 Tab by mouth three (3) times daily as needed for Muscle Spasm(s). 20  Primus Gallus, NP     No Known Allergies    Patient Active Problem List   Diagnosis Code    Previous  section D99.255     Patient Active Problem List    Diagnosis Date Noted    Previous  section 2020     Current Outpatient Medications   Medication Sig Dispense Refill    norethindrone (MICRONOR) 0.35 mg tab TAKE 1 TABLET BY MOUTH EVERY DAY      prenatal vit-iron fumarate-fa 27 mg iron- 0.8 mg tab tablet Take 1 Tab by mouth daily.  30 Tab 3     No Known Allergies  Past Medical History:   Diagnosis Date    Subchorionic hemorrhage in first trimester 2019     Past Surgical History:   Procedure Laterality Date    HX  SECTION      x 2    HX DILATION AND CURETTAGE      post ectopic pregnancy    HX TONSILLECTOMY       Family History   Problem Relation Age of Onset    Diabetes Mother     Thyroid Disease Mother     Hypertension Father      Social History     Tobacco Use    Smoking status: Never Smoker    Smokeless tobacco: Never Used   Substance Use Topics    Alcohol use: Never     Frequency: Never       ROS    I affirm this is a Patient-Initiated Episode with a Patient who has not had a related appointment within my department in the past 7 days or scheduled within the next 24 hours. Total Time: minutes: 11-20 minutes    Note: not billable if this call serves to triage the patient into an appointment for the relevant concern        HPI   She reports thinning  hair for > 3 months after birth of child. Continues to take PNV, adequate water intake and diet balanced. No history of similar symptoms. No family history of alopecia   No  complications   Requests to review imaging results. Did not receive result letter   Cervical neck pain resolved   Patient lactating       Objective  Physical Exam     Assessment & Plan    ICD-10-CM ICD-9-CM    1. Left thyroid nodule E04.1 241.0 REFERRAL TO ENDOCRINOLOGY   2. Hair loss L65.9 704.00    3. Cervical pain (neck) M54.2 723.1    4. Lactating mother Z39.1 V24.1      Follow-up and Dispositions    · Return in about 4 weeks (around 2020) for hair loss . Instructed to continue PNV, add Biotin, trial of new hair care products,consult with professional .  Encouraged  adequate water intake and balanced diet   lab results and schedule of future lab studies reviewed with patient  reviewed diet, exercise and weight control  reviewed medications and side effects in detail  radiology results and schedule of future radiology studies reviewed with patient    Patient voices understanding and acceptance of this advice and will call back if any further questions or concerns.   Mikal Teresa, JOCE

## 2020-07-07 ENCOUNTER — VIRTUAL VISIT (OUTPATIENT)
Dept: ENDOCRINOLOGY | Age: 30
End: 2020-07-07

## 2020-07-07 DIAGNOSIS — E04.9 GOITER: ICD-10-CM

## 2020-07-07 DIAGNOSIS — E04.1 THYROID NODULE: Primary | ICD-10-CM

## 2020-07-07 NOTE — PROGRESS NOTES
**DUE TO PANDEMIC AND HEALTH CONCERNS IN THE COMMUNITY, THIS PATIENT WAS EITHER ILL OR FOUND TO BE HIGH RISK FOR IN-PERSON EVALUATION WITHIN THE CLINIC. THE FOLLOWING IS A VIRTUAL TELEMEDICINE VIDEO ENCOUNTER VIA [employer], TO WHICH THE PATIENT AGREED. THE PURPOSE IS TO LIMIT INTERRUPTIONS IN HEALTHCARE AND TO PROVIDE FOR ONGOING URGENT NEEDS UNDER THE CURRENT CONDITIONS. CONSULTATION REQUESTED BY: Glenn Narayanan NP     REASON FOR CONSULT: Evaluation of thyroid nodule    CHIEF COMPLAINT: Thyroid nodule    HISTORY OF PRESENT ILLNESS:   Christiano Birmingham is a 27 y.o. female with a PMHx as noted below who was referred to our endocrinology clinic for evaluation of a thyroid nodule. Patient describes that she had some dysphagia felt a lump in her neck,  She reports some hair loss in addition to the mild dysphagia,  A thyroid ultrasound was obtained by her primary doctor, see below,  Family history of thyroid disorder is noted in sister, treated,  No known radiation exposure. No history of hyper or hypothyroidism. 20: Thyroid Ultrasound:    Mildly enlarged: R lobe 5.5 cm, L lobe 5.8 cm   Solitary L lower 9 mm solid thyroid nodule   No other description in report    Review of most recent thyroid function:  Lab Results   Component Value Date    TSH 2.770 07/15/2019    FT4 1.20 07/15/2019      TSILT = Thyroid stimulating antibodies  TMCLT = TPO antibodies  T3LT = Total T3 levels    At this time they would like to further investigate the nature of the thyroid nodule.     PAST MEDICAL/SURGICAL HISTORY:   Past Medical History:   Diagnosis Date    Subchorionic hemorrhage in first trimester 2019     Past Surgical History:   Procedure Laterality Date    HX  SECTION      x 2    HX DILATION AND CURETTAGE      post ectopic pregnancy    HX TONSILLECTOMY         ALLERGIES:   No Known Allergies    MEDICATIONS ON ADMISSION:     Current Outpatient Medications:     norethindrone (MICRONOR) 0.35 mg tab, TAKE 1 TABLET BY MOUTH EVERY DAY, Disp: , Rfl:     prenatal vit-iron fumarate-fa 27 mg iron- 0.8 mg tab tablet, Take 1 Tab by mouth daily. , Disp: 30 Tab, Rfl: 3    SOCIAL HISTORY:   Social History     Socioeconomic History    Marital status:      Spouse name: Not on file    Number of children: Not on file    Years of education: Not on file    Highest education level: Not on file   Occupational History    Not on file   Social Needs    Financial resource strain: Not on file    Food insecurity     Worry: Not on file     Inability: Not on file    Transportation needs     Medical: Not on file     Non-medical: Not on file   Tobacco Use    Smoking status: Never Smoker    Smokeless tobacco: Never Used   Substance and Sexual Activity    Alcohol use: Never     Frequency: Never    Drug use: Never    Sexual activity: Yes     Partners: Female     Birth control/protection: None   Lifestyle    Physical activity     Days per week: Not on file     Minutes per session: Not on file    Stress: Not on file   Relationships    Social connections     Talks on phone: Not on file     Gets together: Not on file     Attends Faith service: Not on file     Active member of club or organization: Not on file     Attends meetings of clubs or organizations: Not on file     Relationship status: Not on file    Intimate partner violence     Fear of current or ex partner: Not on file     Emotionally abused: Not on file     Physically abused: Not on file     Forced sexual activity: Not on file   Other Topics Concern     Service Not Asked    Blood Transfusions Not Asked    Caffeine Concern Not Asked    Occupational Exposure Not Asked   Maricruz Hendricks Hazards Not Asked    Sleep Concern Not Asked    Stress Concern Not Asked    Weight Concern Not Asked    Special Diet Not Asked    Back Care Not Asked    Exercise Not Asked    Bike Helmet Not Asked    Seat Belt Not Asked    Self-Exams Not Asked   Social History Narrative    Not on file       FAMILY HISTORY:  Family History   Problem Relation Age of Onset    Diabetes Mother     Thyroid Disease Mother     Hypertension Father        REVIEW OF SYSTEMS: Complete ROS assessed and noted for that which is described above, all else are negative. Eyes: normal  ENT: normal  CVS: normal  Resp: normal  GI: normal  : normal  GYN: normal  Endocrine: normal  Integument: normal  Musculoskeletal: normal  Neuro: normal  Psych: normal    PHYSICAL EXAMINATION:  Telemedicine Visit    GENERAL: NCAT, Appears well nourished  EYES: EOMI, non-icteric, no proptosis  Ear/Nose/Throat: NCAT, no visible inflammation or masses  CARDIOVASCULAR: no cyanosis, no visible JVD  RESPIRATORY: comfortable respirations observed, no cyanosis  MUSCULOSKELETAL: Normal ROM of upper extremities observed  SKIN: No edema, rash, or other significant changes observed  NEUROLOGIC:  AAOx3  PSYCHIATRIC: Normal affect, Normal insight and judgement    REVIEW OF LABORATORY AND RADIOLOGY DATA:   Labs and documentation have been reviewed as described above. ASSESSMENT AND PLAN:   Gustavo Jacques is a 27 y.o. female with a PMHx as noted above who was referred to our endocrinology clinic for evaluation of a thyroid nodule. Thyroid Nodule  Goiter    Today we discussed the incidence of thyroid nodules in the community and the natural history of most thyroid nodules. We also discussed the proper workup and evaluation for thyroid nodules and management based upon the clinical features and sonographic patterns which may suggest benign vs more concerning nodules. We discussed the role of evaluating thyroid function to assess the impact of thyroid hormone levels on nodular size and to evaluate for autonomous toxic nodules, as this would also affect management decisions. Ultimately we also discussed the role of thyroid biopsies should that become indicated.     I have personally reviewed the images of her thyroid ultrasound and have included select images above. Today based on our discussion, we have decided that we will first investigate the mild goiter and symptoms by reassessing her thyroid function together with thyroid autoantibodies as treatment for any underlying thyroid dysfunction may help reduce symptoms, thyroid growth, and even nodular growth. We noted the potential for post partum thyroiditis and hashimoto thyroiditis as contributors to goiter and symptoms. Thereafter we will determine whether or not to proceed with biopsy or repeat a thyroid ultrasound in 9 months for surveillance. Based on her impression, she would like to first treat any underlying thyroid dysfunction, if present. Summary:   Function / Goiter: Thyroid lab panel ordered, will review and discuss by phone  Anatomy / Nodule: Surveillance vs biopsy, will discuss again after reviewing thyroid labs    RTC based on findings    45 minutes spent toward telemedicine visit today of which >50% of this time was spent in counseling and coordination of care. Zan Matthews.  [address] [facility]

## 2020-07-10 ENCOUNTER — TELEPHONE (OUTPATIENT)
Dept: ENDOCRINOLOGY | Age: 30
End: 2020-07-10

## 2020-07-10 NOTE — TELEPHONE ENCOUNTER
----- Message from Virgilio Watt sent at 7/9/2020  2:33 PM EDT -----  Regarding: Dr Anil Carrillo  General Message/Vendor Calls    Caller's first and last name: Janae/[employer]/ Jessica[employer]      Reason for call: Stefanie Morales is reporting that the pt is at [facility] requesting blood work following a Telemed visit on 07/08/20, however, they do not have an order and is requesting one to (f) [phone]      Callback required yes/no and why:       Best contact number(s):[phone]      Details to clarify the request:      Virgilio Watt

## 2020-07-20 ENCOUNTER — TELEPHONE (OUTPATIENT)
Dept: ENDOCRINOLOGY | Age: 30
End: 2020-07-20

## 2020-07-20 DIAGNOSIS — E04.1 THYROID NODULE: Primary | ICD-10-CM

## 2020-07-20 LAB
T3 SERPL-MCNC: 157 NG/DL (ref 71–180)
T4 FREE SERPL-MCNC: 1.6 NG/DL (ref 0.82–1.77)
THYROGLOB AB SERPL-ACNC: 1.6 IU/ML (ref 0–0.9)
THYROPEROXIDASE AB SERPL-ACNC: 157 IU/ML (ref 0–34)
TSH RECEP AB SER-ACNC: <1.1 IU/L (ref 0–1.75)
TSH SERPL DL<=0.005 MIU/L-ACNC: 0.74 UIU/ML (ref 0.45–4.5)
TSI SER-ACNC: <0.1 IU/L (ref 0–0.55)

## 2020-07-20 NOTE — TELEPHONE ENCOUNTER
Results reviewed. Margaux Pearson, you can skip down to bold text below),    Called and spoke with patient and her . TPO and TgAb positive but TSI/TRAb negative. This suggests that underlying hashimoto disease can be the cause of her mild goiter. Thyroid levels however are healthy and no role for thyroid hormone replacement at this time. We had prev discussed treating underlying hashimoto, if present, which may reduce thyroid size, but not appropriate to start therapy at this time. Given option to proceed with biopsy, or repeat ultrasound in 6 months along with thyroid levels, they chose to repeat in 6 months. I will have them return to clinic Dec 8 at 8:30 AM,       ------------  [facility], Could you mail them the lab AND ultrasound requisition forms that were ordered today, to their home per their request. Thanks ! Christopher Raymond.  [facility]  [address] [place] [address]

## 2020-11-02 ENCOUNTER — HOSPITAL ENCOUNTER (EMERGENCY)
Age: 30
Discharge: HOME | End: 2020-11-02
Attending: EMERGENCY MEDICINE | Admitting: EMERGENCY MEDICINE
Payer: COMMERCIAL

## 2020-11-02 ENCOUNTER — APPOINTMENT (OUTPATIENT)
Dept: CT IMAGING | Age: 30
End: 2020-11-02
Attending: EMERGENCY MEDICINE
Payer: COMMERCIAL

## 2020-11-02 VITALS
HEART RATE: 84 BPM | SYSTOLIC BLOOD PRESSURE: 116 MMHG | TEMPERATURE: 98.3 F | RESPIRATION RATE: 18 BRPM | OXYGEN SATURATION: 100 % | DIASTOLIC BLOOD PRESSURE: 75 MMHG

## 2020-11-02 DIAGNOSIS — N30.90 CYSTITIS: ICD-10-CM

## 2020-11-02 DIAGNOSIS — R10.84 ABDOMINAL PAIN, GENERALIZED: Primary | ICD-10-CM

## 2020-11-02 LAB
ALBUMIN SERPL-MCNC: 4.4 G/DL (ref 3.5–5)
ALBUMIN/GLOB SERPL: 1 {RATIO} (ref 1.1–2.2)
ALP SERPL-CCNC: 64 U/L (ref 45–117)
ALT SERPL-CCNC: 38 U/L (ref 12–78)
ANION GAP SERPL CALC-SCNC: 5 MMOL/L (ref 5–15)
APPEARANCE UR: ABNORMAL
AST SERPL-CCNC: 22 U/L (ref 15–37)
BACTERIA URNS QL MICRO: ABNORMAL /HPF
BASOPHILS # BLD: 0.1 K/UL (ref 0–0.1)
BASOPHILS NFR BLD: 1 % (ref 0–1)
BILIRUB SERPL-MCNC: 1 MG/DL (ref 0.2–1)
BILIRUB UR QL: NEGATIVE
BUN SERPL-MCNC: 8 MG/DL (ref 6–20)
BUN/CREAT SERPL: 10 (ref 12–20)
CALCIUM SERPL-MCNC: 9.5 MG/DL (ref 8.5–10.1)
CHLORIDE SERPL-SCNC: 103 MMOL/L (ref 97–108)
CO2 SERPL-SCNC: 26 MMOL/L (ref 21–32)
COLOR UR: ABNORMAL
COMMENT, HOLDF: NORMAL
CREAT SERPL-MCNC: 0.81 MG/DL (ref 0.55–1.02)
DIFFERENTIAL METHOD BLD: ABNORMAL
EOSINOPHIL # BLD: 0.1 K/UL (ref 0–0.4)
EOSINOPHIL NFR BLD: 1 % (ref 0–7)
EPITH CASTS URNS QL MICRO: ABNORMAL /LPF
ERYTHROCYTE [DISTWIDTH] IN BLOOD BY AUTOMATED COUNT: 13 % (ref 11.5–14.5)
GLOBULIN SER CALC-MCNC: 4.4 G/DL (ref 2–4)
GLUCOSE SERPL-MCNC: 87 MG/DL (ref 65–100)
GLUCOSE UR STRIP.AUTO-MCNC: NEGATIVE MG/DL
HCG UR QL: NEGATIVE
HCT VFR BLD AUTO: 41.6 % (ref 35–47)
HGB BLD-MCNC: 14.1 G/DL (ref 11.5–16)
HGB UR QL STRIP: ABNORMAL
HYALINE CASTS URNS QL MICRO: ABNORMAL /LPF (ref 0–5)
IMM GRANULOCYTES # BLD AUTO: 0 K/UL (ref 0–0.04)
IMM GRANULOCYTES NFR BLD AUTO: 0 % (ref 0–0.5)
KETONES UR QL STRIP.AUTO: NEGATIVE MG/DL
LEUKOCYTE ESTERASE UR QL STRIP.AUTO: ABNORMAL
LYMPHOCYTES # BLD: 2.7 K/UL (ref 0.8–3.5)
LYMPHOCYTES NFR BLD: 23 % (ref 12–49)
MCH RBC QN AUTO: 29.3 PG (ref 26–34)
MCHC RBC AUTO-ENTMCNC: 33.9 G/DL (ref 30–36.5)
MCV RBC AUTO: 86.3 FL (ref 80–99)
MONOCYTES # BLD: 0.6 K/UL (ref 0–1)
MONOCYTES NFR BLD: 5 % (ref 5–13)
NEUTS SEG # BLD: 8.3 K/UL (ref 1.8–8)
NEUTS SEG NFR BLD: 70 % (ref 32–75)
NITRITE UR QL STRIP.AUTO: NEGATIVE
NRBC # BLD: 0 K/UL (ref 0–0.01)
NRBC BLD-RTO: 0 PER 100 WBC
PH UR STRIP: 5 [PH] (ref 5–8)
PLATELET # BLD AUTO: 349 K/UL (ref 150–400)
PMV BLD AUTO: 11.5 FL (ref 8.9–12.9)
POTASSIUM SERPL-SCNC: 3.7 MMOL/L (ref 3.5–5.1)
PROT SERPL-MCNC: 8.8 G/DL (ref 6.4–8.2)
PROT UR STRIP-MCNC: NEGATIVE MG/DL
RBC # BLD AUTO: 4.82 M/UL (ref 3.8–5.2)
RBC #/AREA URNS HPF: ABNORMAL /HPF (ref 0–5)
SAMPLES BEING HELD,HOLD: NORMAL
SODIUM SERPL-SCNC: 134 MMOL/L (ref 136–145)
SP GR UR REFRACTOMETRY: 1.01 (ref 1–1.03)
UR CULT HOLD, URHOLD: NORMAL
UROBILINOGEN UR QL STRIP.AUTO: 0.2 EU/DL (ref 0.2–1)
WBC # BLD AUTO: 11.7 K/UL (ref 3.6–11)
WBC URNS QL MICRO: ABNORMAL /HPF (ref 0–4)

## 2020-11-02 PROCEDURE — 85025 COMPLETE CBC W/AUTO DIFF WBC: CPT

## 2020-11-02 PROCEDURE — 81025 URINE PREGNANCY TEST: CPT

## 2020-11-02 PROCEDURE — 99283 EMERGENCY DEPT VISIT LOW MDM: CPT

## 2020-11-02 PROCEDURE — 80053 COMPREHEN METABOLIC PANEL: CPT

## 2020-11-02 PROCEDURE — 36415 COLL VENOUS BLD VENIPUNCTURE: CPT

## 2020-11-02 PROCEDURE — 74176 CT ABD & PELVIS W/O CONTRAST: CPT

## 2020-11-02 PROCEDURE — 74011250636 HC RX REV CODE- 250/636: Performed by: EMERGENCY MEDICINE

## 2020-11-02 PROCEDURE — 96374 THER/PROPH/DIAG INJ IV PUSH: CPT

## 2020-11-02 PROCEDURE — 81001 URINALYSIS AUTO W/SCOPE: CPT

## 2020-11-02 RX ORDER — NITROFURANTOIN 25; 75 MG/1; MG/1
100 CAPSULE ORAL 2 TIMES DAILY
Qty: 6 CAP | Refills: 0 | Status: SHIPPED | OUTPATIENT
Start: 2020-11-02 | End: 2020-11-05

## 2020-11-02 RX ORDER — KETOROLAC TROMETHAMINE 30 MG/ML
15 INJECTION, SOLUTION INTRAMUSCULAR; INTRAVENOUS
Status: COMPLETED | OUTPATIENT
Start: 2020-11-02 | End: 2020-11-02

## 2020-11-02 RX ADMIN — SODIUM CHLORIDE 1000 ML: 900 INJECTION, SOLUTION INTRAVENOUS at 17:20

## 2020-11-02 RX ADMIN — KETOROLAC TROMETHAMINE 15 MG: 30 INJECTION, SOLUTION INTRAMUSCULAR at 17:44

## 2020-11-02 NOTE — ED TRIAGE NOTES
Pt sent from OBGYN for further evaluation for dysuria and urinary frequency that started this morning. Pt requesting to not perform labs until speaking with physician.

## 2020-11-02 NOTE — ED PROVIDER NOTES
66-year-old female presents from home via private vehicle accompanied by her  with a chief complaint of abdominal pain. Patient was sent from her OBs office where she went to earlier today complaining of lower abdominal pain. She said the pain started this morning it is located mostly across her lower abdomen but greater on her right side. No radiation. No exacerbating or alleviating factors. She took some Tylenol this morning with minimal improvement. She denies any vaginal bleeding or discharge. She is had some discomfort with urination but denies any burning pain. She denies any vomiting, fevers, cough, shortness of breath. Patient has that she had an IUD placed about 10 days ago. This was reportedly evaluated by the [facility] doctor who states that it appeared to be in place on pelvic exam.  They found blood in the patient's urine and sent her in for further evaluation.            Past Medical History:   Diagnosis Date    Subchorionic hemorrhage in first trimester 2019       Past Surgical History:   Procedure Laterality Date    HX  SECTION      x 2    HX DILATION AND CURETTAGE      post ectopic pregnancy    HX TONSILLECTOMY           Family History:   Problem Relation Age of Onset    Diabetes Mother     Thyroid Disease Mother     Hypertension Father        Social History     Socioeconomic History    Marital status:      Spouse name: Not on file    Number of children: Not on file    Years of education: Not on file    Highest education level: Not on file   Occupational History    Not on file   Social Needs    Financial resource strain: Not on file    Food insecurity     Worry: Not on file     Inability: Not on file    Transportation needs     Medical: Not on file     Non-medical: Not on file   Tobacco Use    Smoking status: Never Smoker    Smokeless tobacco: Never Used   Substance and Sexual Activity    Alcohol use: Never     Frequency: Never    Drug use: Never    Sexual activity: Yes     Partners: Female     Birth control/protection: None   Lifestyle    Physical activity     Days per week: Not on file     Minutes per session: Not on file    Stress: Not on file   Relationships    Social connections     Talks on phone: Not on file     Gets together: Not on file     Attends Advent service: Not on file     Active member of club or organization: Not on file     Attends meetings of clubs or organizations: Not on file     Relationship status: Not on file    Intimate partner violence     Fear of current or ex partner: Not on file     Emotionally abused: Not on file     Physically abused: Not on file     Forced sexual activity: Not on file   Other Topics Concern     Service Not Asked    Blood Transfusions Not Asked    Caffeine Concern Not Asked    Occupational Exposure Not Asked   Eletha Mate Hazards Not Asked    Sleep Concern Not Asked    Stress Concern Not Asked    Weight Concern Not Asked    Special Diet Not Asked    Back Care Not Asked    Exercise Not Asked    Bike Helmet Not Asked   [address] Not Asked    Self-Exams Not Asked   Social History Narrative    Not on file         ALLERGIES: Patient has no known allergies. Review of Systems   Constitutional: Negative for fever. HENT: Negative for facial swelling. Eyes: Negative for visual disturbance. Respiratory: Negative for chest tightness. Cardiovascular: Negative for chest pain. Gastrointestinal: Positive for abdominal pain. Genitourinary: Negative for difficulty urinating and dysuria. Musculoskeletal: Negative for arthralgias. Skin: Negative for rash. Neurological: Negative for headaches. Hematological: Negative for adenopathy. Psychiatric/Behavioral: Negative for suicidal ideas. Vitals:    11/02/20 1628   BP: 116/75   Pulse: 84   Resp: 18   Temp: 98.3 °F (36.8 °C)   SpO2: 100%            Physical Exam  Vitals signs and nursing note reviewed.    Constitutional: General: She is not in acute distress. Appearance: She is well-developed. HENT:      Head: Normocephalic and atraumatic. Eyes:      General: No scleral icterus. Conjunctiva/sclera: Conjunctivae normal.      Pupils: Pupils are equal, round, and reactive to light. Neck:      Musculoskeletal: Normal range of motion and neck supple. Cardiovascular:      Rate and Rhythm: Normal rate. Heart sounds: No murmur. Pulmonary:      Effort: Pulmonary effort is normal. No respiratory distress. Abdominal:      General: There is no distension. Musculoskeletal: Normal range of motion. Skin:     General: Skin is warm and dry. Findings: No rash. Neurological:      Mental Status: She is alert and oriented to person, place, and time. MDM  Number of Diagnoses or Management Options  Abdominal pain, generalized:   Cystitis:   Diagnosis management comments: Assessment: Blood work and CT scan were unremarkable. Patient resting comfortably no distress. Vital signs stable. UA shows possible bacteria and and leukocytes. Her pain could have been due to cystitis or bladder spasm. Will treat with Keflex and have her follow-up with her OB/GYN next few days for repeat assessment. She can return to the ED if she has any new or worsening symptoms.          Procedures

## 2020-12-01 ENCOUNTER — TELEPHONE (OUTPATIENT)
Dept: ENDOCRINOLOGY | Age: 30
End: 2020-12-01

## 2020-12-01 NOTE — TELEPHONE ENCOUNTER
----- Message from [facility] sent at 12/1/2020  3:17 PM EST -----  Regarding: /Telephone  General Message/Vendor Calls    Caller's first and last name:Vu Jay      Reason for call:lab orders and ultra sound      Callback required yes/no and why:yes      Best contact number(s):[phone]      Details to clarify the request:A friend and  is requesting if labs is needed for schedule appt ,and if so lab orders is needed. Pt also were told about an ultra sound and has not heard any information about it to schedule .       [facility]

## 2020-12-01 NOTE — TELEPHONE ENCOUNTER
12/1/2020  4:11 PM      I have scheduled for an  not sure about the rest of the message.         Thanks

## 2020-12-02 ENCOUNTER — TELEPHONE (OUTPATIENT)
Dept: ENDOCRINOLOGY | Age: 30
End: 2020-12-02

## 2020-12-02 NOTE — TELEPHONE ENCOUNTER
----- Message from [facility] sent at 12/2/2020 10:11 AM EST -----  Regarding: Dr. Analy Wayne  Patient return call    Caller's first and last name and relationship (if not the patient): Bibi Glez ()      Best contact number(s): [phone]      Whose call is being returned: Unknown       Details to clarify the request:  Mr. Nathalie Rosado is requesting a call back on behalf of the patient because she needs to know if Dr. Madai Bansal wanted her to have labwork and/or an ultrasound done before her appointment on 12/8. Someone from the office left a message on Mr. Jose Herman. He is requesting that a detailed message be left with the answer to this question because he cannot call back and wait on hold only to have another message sent.         [facility]

## 2020-12-02 NOTE — TELEPHONE ENCOUNTER
----- Message from [facility] sent at 12/2/2020 10:11 AM EST -----  Regarding: Dr. Krystian Champion  Patient return call     Caller's first and last name and relationship (if not the patient): Verna Bray ()        Best contact number(s): [phone]        Whose call is being returned: Unknown         Details to clarify the request:  Mr. Jimbo Goode is requesting a call back on behalf of the patient because she needs to know if Dr. Matteo Boo wanted her to have labwork and/or an ultrasound done before her appointment on 12/8. Someone from the office left a message on Mr. Dandre Fierro.   He is requesting that a detailed message be left with the answer to this question because he cannot call back and wait on hold only to have another message sent.          [facility]

## 2020-12-02 NOTE — TELEPHONE ENCOUNTER
I attempted to return this call and reached his voice mail. I left a message asking him to give me a call back. I have also scheduled the thyroid US which is 12/7/2020 at 2 PM at [facility]. Aspen Jessica

## 2020-12-02 NOTE — TELEPHONE ENCOUNTER
I attempted to call Mr. Crenshaw Tova again and again reached his voice mail. I left a message letting him know that she did need lab work done and that she had a appointment on 12/7 for her thyroid US at 2 PM at [facility]. I also said if they had any further questions to give me a call back.   Dolly Craft

## 2020-12-04 ENCOUNTER — HOSPITAL ENCOUNTER (OUTPATIENT)
Dept: LAB | Age: 30
Discharge: HOME | End: 2020-12-04
Payer: MEDICAID

## 2020-12-04 PROCEDURE — 84443 ASSAY THYROID STIM HORMONE: CPT

## 2020-12-04 PROCEDURE — 36415 COLL VENOUS BLD VENIPUNCTURE: CPT

## 2020-12-04 PROCEDURE — 84439 ASSAY OF FREE THYROXINE: CPT

## 2020-12-05 LAB
T4 FREE SERPL-MCNC: 1.03 NG/DL (ref 0.82–1.77)
TSH SERPL DL<=0.005 MIU/L-ACNC: 4.47 UIU/ML (ref 0.45–4.5)

## 2020-12-07 ENCOUNTER — HOSPITAL ENCOUNTER (OUTPATIENT)
Dept: ULTRASOUND IMAGING | Age: 30
Discharge: HOME | End: 2020-12-07
Attending: INTERNAL MEDICINE
Payer: MEDICAID

## 2020-12-07 DIAGNOSIS — E04.1 THYROID NODULE: ICD-10-CM

## 2020-12-07 PROCEDURE — 76536 US EXAM OF HEAD AND NECK: CPT

## 2020-12-08 ENCOUNTER — VIRTUAL VISIT (OUTPATIENT)
Dept: ENDOCRINOLOGY | Age: 30
End: 2020-12-08
Payer: MEDICAID

## 2020-12-08 DIAGNOSIS — E06.3 HYPOTHYROIDISM DUE TO HASHIMOTO'S THYROIDITIS: ICD-10-CM

## 2020-12-08 DIAGNOSIS — E04.1 THYROID NODULE: Primary | ICD-10-CM

## 2020-12-08 DIAGNOSIS — E04.9 GOITER: ICD-10-CM

## 2020-12-08 DIAGNOSIS — E03.8 HYPOTHYROIDISM DUE TO HASHIMOTO'S THYROIDITIS: ICD-10-CM

## 2020-12-08 PROCEDURE — 99215 OFFICE O/P EST HI 40 MIN: CPT | Performed by: INTERNAL MEDICINE

## 2020-12-08 RX ORDER — LEVOTHYROXINE SODIUM 25 UG/1
25 TABLET ORAL
Qty: 90 TAB | Refills: 3 | Status: SHIPPED | OUTPATIENT
Start: 2020-12-08 | End: 2021-04-20 | Stop reason: SDUPTHER

## 2020-12-08 NOTE — PROGRESS NOTES
**DUE TO PANDEMIC AND HEALTH CONCERNS IN THE COMMUNITY, THIS PATIENT WAS EITHER ILL OR FOUND TO BE HIGH RISK FOR IN-PERSON EVALUATION WITHIN THE CLINIC. THE FOLLOWING IS A VIRTUAL TELEMEDICINE VIDEO ENCOUNTER VIA [employer], TO WHICH THE PATIENT AGREED. THE PURPOSE IS TO LIMIT INTERRUPTIONS IN HEALTHCARE AND TO PROVIDE FOR ONGOING URGENT NEEDS UNDER THE CURRENT CONDITIONS. CHIEF COMPLAINT: f/u Thyroid nodule, hashimoto thyroiditis    HISTORY OF PRESENT ILLNESS:   Dillon Garza is a 27 y.o. female with a PMHx as noted below who presents for f/u of a thyroid nodule and goiter. Patient described that she had some dysphagia felt a lump in her neck,  She reported some hair loss in addition to the mild dysphagia,  A thyroid ultrasound was obtained by her primary doctor, see below,  Family history of thyroid disorder is noted in sister, treated,  No known radiation exposure. No history of hyper or hypothyroidism. 20: Thyroid Ultrasound:    Mildly enlarged: R lobe 5.5 cm, L lobe 5.8 cm   Solitary L lower 9 mm solid thyroid nodule   No other description in report    We discussed biopsy vs surveillance US and she elected surveillance.      20: Thyroid Ultrasound   Unchanged 1.1 x 0.7 x 0.7 cm left lower nodule    She was noted for TPO+ finding,  TSH repeated now at 4.47, symptomatic    Review of most recent thyroid function:  Lab Results   Component Value Date    TSH 4.470 2020    TSH 0.738 2020    TSH 2.770 07/15/2019    FT4 1.03 2020    FT4 1.60 2020    FT4 1.20 07/15/2019    T3LT 157 2020    TSILT <0.10 2020    TMCLT 157 (H) 2020      TSILT = Thyroid stimulating antibodies  TMCLT = TPO antibodies  T3LT = Total T3 levels    PAST MEDICAL/SURGICAL HISTORY:   Past Medical History:   Diagnosis Date    Subchorionic hemorrhage in first trimester 2019     Past Surgical History:   Procedure Laterality Date    HX  SECTION      x 2    HX DILATION AND CURETTAGE      post ectopic pregnancy    HX TONSILLECTOMY         ALLERGIES:   No Known Allergies    MEDICATIONS ON ADMISSION:     Current Outpatient Medications:     prenatal vit-iron fumarate-fa 27 mg iron- 0.8 mg tab tablet, Take 1 Tab by mouth daily. , Disp: 30 Tab, Rfl: 3    norethindrone (MICRONOR) 0.35 mg tab, TAKE 1 TABLET BY MOUTH EVERY DAY, Disp: , Rfl:     SOCIAL HISTORY:   Social History     Socioeconomic History    Marital status:      Spouse name: Not on file    Number of children: Not on file    Years of education: Not on file    Highest education level: Not on file   Occupational History    Not on file   Social Needs    Financial resource strain: Not on file    Food insecurity     Worry: Not on file     Inability: Not on file    Transportation needs     Medical: Not on file     Non-medical: Not on file   Tobacco Use    Smoking status: Never Smoker    Smokeless tobacco: Never Used   Substance and Sexual Activity    Alcohol use: Never     Frequency: Never    Drug use: Never    Sexual activity: Yes     Partners: Female     Birth control/protection: None   Lifestyle    Physical activity     Days per week: Not on file     Minutes per session: Not on file    Stress: Not on file   Relationships    Social connections     Talks on phone: Not on file     Gets together: Not on file     Attends Oriental orthodox service: Not on file     Active member of club or organization: Not on file     Attends meetings of clubs or organizations: Not on file     Relationship status: Not on file    Intimate partner violence     Fear of current or ex partner: Not on file     Emotionally abused: Not on file     Physically abused: Not on file     Forced sexual activity: Not on file   Other Topics Concern   [email or web address] Service Not Asked    Blood Transfusions Not Asked    Caffeine Concern Not Asked    Occupational Exposure Not Asked   Deyvi Groton Hazards Not Asked    Sleep Concern Not Asked    Stress Concern Not Asked    Weight Concern Not Asked    Special Diet Not Asked    Back Care Not Asked    Exercise Not Asked    Bike Helmet Not Asked   [address] [facility] Not Asked    Self-Exams Not Asked   Social History Narrative    Not on file       FAMILY HISTORY:  Family History   Problem Relation Age of Onset    Diabetes Mother     Thyroid Disease Mother     Hypertension Father        REVIEW OF SYSTEMS: Complete ROS assessed and noted for that which is described above, all else are negative. Eyes: normal  ENT: normal  CVS: normal  Resp: normal  GI: normal  : normal  GYN: normal  Endocrine: mild hair loss, goiter  Integument: normal  Musculoskeletal: normal  Neuro: normal  Psych: normal    PHYSICAL EXAMINATION:  Telemedicine Visit    GENERAL: NCAT, Appears well nourished  EYES: EOMI, non-icteric, no proptosis  Ear/Nose/Throat: NCAT, no visible inflammation or masses  CARDIOVASCULAR: no cyanosis, no visible JVD  RESPIRATORY: comfortable respirations observed, no cyanosis  MUSCULOSKELETAL: Normal ROM of upper extremities observed  SKIN: No edema, rash, or other significant changes observed  NEUROLOGIC:  AAOx3  PSYCHIATRIC: Normal affect, Normal insight and judgement    REVIEW OF LABORATORY AND RADIOLOGY DATA:   Labs and documentation have been reviewed as described above. ASSESSMENT AND PLAN:   Nancy Paniagua is a 27 y.o. female with a PMHx as noted above who presents for f/u of a thyroid nodule and hypothyroidism. Thyroid Nodule  Goiter    Thyroid Nodule: Stable on repeat US. I suspect her goiter is related. Treating the underlying hypothyroidism will likely reduce her thyroid gland size over time. We can repeat an ultrasound again in the coming years after we have started thyroid hormone replacement and optimized her dose.      Hashimoto disease:   Patient has an enlarging goiter per her feeling, she has symptoms of hypothyroidism, she has a + TPO antibody test, and she has a TSH that did rise from around 0.7 to 4.47 over the past 6 months. In this case, treatment is very reasonable and we will go ahead and start therapy with a low dose of levothyroxine at 25 mcg once daily. We discussed why this is started and how this can affect her thyroid gland, and we also spent time discussing the correct way to take this each morning. She demonstrated her understanding. I was able to answer all of her questions today and she was welcomed to reach out if she had any further concerns. RTC April 20 at 8:30 with thyroid prelabs, ordered    40 minutes spent toward telemedicine visit today, with use of multi-person conference including the professional  of which >50% of this time was spent in counseling and coordination of care. Susan Ramírez.  [address] [facility]

## 2021-02-22 ENCOUNTER — TELEPHONE (OUTPATIENT)
Dept: ENDOCRINOLOGY | Age: 31
End: 2021-02-22

## 2021-02-22 NOTE — TELEPHONE ENCOUNTER
----- Message from Sydni Simental sent at 2/22/2021  2:06 PM EST -----  Regarding: Dr. Cheryle Prow: [phone]  General Message/Vendor Calls    Caller's first and last name:Pt      Reason for call: Patient would like a call back to ask Dr. Concepcion Raw  questions about her medications. Callback required yes/no and why:Yes, med check.        Best contact number(s):[phone]      Details to clarify the request:n/a      Sydni Simental

## 2021-02-23 NOTE — TELEPHONE ENCOUNTER
I called and spoke to patient, she desired to trial higher dose of levothyroxine. I reviewed her labs and noted her TSH at the time was 4.4 with + TPO antibodies and heterogenous gland on US so we started therapy. I started 25 mcg daily which seems more ideal at this time. We noted that based on her lab findings we will increase further if warranted during her visit. We noted the reason for avoiding hyperthyroidism from over replacement with thyroid hormone. She demonstrated her understanding. Allyson Mcclure.  [address] [facility]  [address] [place] [address]

## 2021-03-10 ENCOUNTER — OFFICE VISIT (OUTPATIENT)
Dept: INTERNAL MEDICINE CLINIC | Age: 31
End: 2021-03-10
Payer: MEDICAID

## 2021-03-10 VITALS
TEMPERATURE: 97.9 F | DIASTOLIC BLOOD PRESSURE: 72 MMHG | OXYGEN SATURATION: 96 % | BODY MASS INDEX: 26.52 KG/M2 | WEIGHT: 165 LBS | HEIGHT: 66 IN | HEART RATE: 88 BPM | RESPIRATION RATE: 14 BRPM | SYSTOLIC BLOOD PRESSURE: 106 MMHG

## 2021-03-10 DIAGNOSIS — R68.89 SENSATION OF HEAVINESS: Primary | ICD-10-CM

## 2021-03-10 DIAGNOSIS — M25.562 ACUTE PAIN OF BOTH KNEES: ICD-10-CM

## 2021-03-10 DIAGNOSIS — R13.10 DYSPHAGIA, UNSPECIFIED TYPE: ICD-10-CM

## 2021-03-10 DIAGNOSIS — M25.561 ACUTE PAIN OF BOTH KNEES: ICD-10-CM

## 2021-03-10 PROCEDURE — 99214 OFFICE O/P EST MOD 30 MIN: CPT | Performed by: NURSE PRACTITIONER

## 2021-03-10 NOTE — PATIENT INSTRUCTIONS
Learning About Swallowing Problems What are swallowing problems? Certain health problems that affect the nervous system can cause trouble swallowing. These conditions include stroke, ALS (also known as Jasmin Gehrig's disease), Parkinson's disease, and multiple sclerosis. The muscles and nerves that help move food through the throat and esophagus may not work right. Growths, such as cancer, and other problems with your esophagus can also make it hard to swallow. The esophagus is the tube that leads from your throat to your stomach. How are swallowing problems diagnosed? A doctor or speech therapist will examine you to check for swallowing problems. You may get swallowing tests to check how well your throat muscles work. For these tests, you swallow a special liquid that helps the doctor see your throat and esophagus on an X-ray or video screen. Other tests use a thin, flexible tube called a scope to check for problems with your esophagus. The doctor puts the scope in your mouth and down your throat to look at your esophagus. What are the symptoms? Symptoms of swallowing problems may include: · Trouble getting food or liquids to go down on the first try. · Gagging, choking, or coughing when you swallow. · Having food or liquids come back up through your throat, mouth, or nose after you swallow. · Feeling like foods or liquids are stuck in some part of your throat or chest. 
· Pain when you swallow. How are swallowing problems treated? How swallowing problems are treated depends on the cause. The main goals of treatment will be to help you eat and swallow safely and get good nutrition. This is important for your health and quality of life. You may learn exercises to train your throat muscles to work together so you're able to swallow better. Learning certain ways to put food in your mouth or to position your head while eating may also help.  
Your doctor or a speech therapist may recommend changes to your diet to help make it easier to swallow. You may need to avoid certain foods or liquids. You also may need to change the thickness of foods or liquids in your diet. To eat and swallow safely, follow any instructions you get from your doctor or therapist. These ideas may help: 
· Sit upright when eating, drinking, and taking pills. · Take small bites of food. Chew completely and swallow before taking another bite. · Take small sips of liquids. · If eating makes you tired, eat smaller but more frequent meals. · Tip your chin down when there is food in your mouth. Where can you learn more? Go to http://www.gray.com/ Enter [phone] in the search box to learn more about \"Learning About Swallowing Problems. \" Current as of: June 26, 2019               Content Version: 12.6 © 9737-5738 [employer]. Care instructions adapted under license by [employer] - is now [employer] (which disclaims liability or warranty for this information). If you have questions about a medical condition or this instruction, always ask your healthcare professional. [place] [address] any warranty or liability for your use of this information.

## 2021-03-10 NOTE — PROGRESS NOTES
Ilana Mason is a 32 y.o. female presents for acute care. Sinhala  ID #  [id number]   HPI     Mild generalized headaches on and off  Feel weakness and heaviness of  tongue, difficulty speaking and swallowing symptoms for 1 month  Denies abnormal gait, tremors, extremity weakness, visual changes, voice change   No choking   Tingling in finger and toes when she wakes up   Touching  any part of body cause pain   No personal or family history of neuromuscular disorder, autoimmune disease     Normal menstrual cycles    Bilateral knee pain; pinching or stabbing pain for 1 week; no swelling   Pain with position change   No injury  No self treatment     Sleep is good  Denies depression of anxiety       Past Medical History:   Diagnosis Date    Subchorionic hemorrhage in first trimester 2019       No Known Allergies     Current Outpatient Medications   Medication Sig    levothyroxine (SYNTHROID) 25 mcg tablet Take 1 Tab by mouth Daily (before breakfast).  prenatal vit-iron fumarate-fa 27 mg iron- 0.8 mg tab tablet Take 1 Tab by mouth daily. No current facility-administered medications for this visit. Past Surgical History:   Procedure Laterality Date    HX  SECTION      x 2    HX DILATION AND CURETTAGE      post ectopic pregnancy    HX TONSILLECTOMY         Current Outpatient Medications   Medication Sig    levothyroxine (SYNTHROID) 25 mcg tablet Take 1 Tab by mouth Daily (before breakfast).  prenatal vit-iron fumarate-fa 27 mg iron- 0.8 mg tab tablet Take 1 Tab by mouth daily. No current facility-administered medications for this visit. Review of Systems   Constitutional: Negative for chills, fever, malaise/fatigue and weight loss. HENT: Negative for congestion and sore throat. Eyes: Negative for blurred vision and double vision. Respiratory: Negative for cough and shortness of breath. Cardiovascular: Negative. Gastrointestinal: Negative. Genitourinary: Negative. Musculoskeletal: Negative. Skin: Negative for itching and rash. Neurological: Positive for focal weakness and headaches. Negative for dizziness. Psychiatric/Behavioral: Negative for depression and memory loss. The patient is not nervous/anxious and does not have insomnia. Objective  Physical Exam  Constitutional:       General: She is not in acute distress. Appearance: Normal appearance. She is not ill-appearing. HENT:      Head: Normocephalic and atraumatic. Right Ear: Tympanic membrane, ear canal and external ear normal. There is no impacted cerumen. Left Ear: Tympanic membrane, ear canal and external ear normal.   Eyes:      Conjunctiva/sclera: Conjunctivae normal.   Neck:      Musculoskeletal: Normal range of motion. Thyroid: No thyroid mass or thyromegaly. Vascular: No carotid bruit. Cardiovascular:      Rate and Rhythm: Normal rate and regular rhythm. Pulses: Normal pulses. Heart sounds: Normal heart sounds. Abdominal:      General: Bowel sounds are normal. There is no distension. Palpations: Abdomen is soft. There is no mass. Tenderness: There is no right CVA tenderness or left CVA tenderness. Musculoskeletal:      Comments: She indicates bilateral inner knee as source of discomfort, normal exam of both knee   Lymphadenopathy:      Cervical: No cervical adenopathy. Skin:     General: Skin is warm and dry. Findings: No erythema or rash. Neurological:      General: No focal deficit present. Mental Status: She is alert. Cranial Nerves: No cranial nerve deficit. Motor: No weakness. Gait: Gait normal.   Psychiatric:         Mood and Affect: Mood normal.         Thought Content:  Thought content normal.          Assessment & Plan    ICD-10-CM ICD-9-CM    1. Sensation of heaviness  R68.89 780.99 SED RATE (ESR)      C REACTIVE PROTEIN, QT      DEANA BY MULTIPLEX FLOW IA, QL      CBC WITH AUTOMATED DIFF      METABOLIC PANEL, COMPREHENSIVE      HEMOGLOBIN A1C WITH EAG      SED RATE (ESR)      C REACTIVE PROTEIN, QT      DEANA BY MULTIPLEX FLOW IA, QL      CBC WITH AUTOMATED DIFF      METABOLIC PANEL, COMPREHENSIVE      HEMOGLOBIN A1C WITH EAG   2. Dysphagia, unspecified type  R13.10 787.20 SED RATE (ESR)      C REACTIVE PROTEIN, QT      DEANA BY MULTIPLEX FLOW IA, QL      CBC WITH AUTOMATED DIFF      METABOLIC PANEL, COMPREHENSIVE      SED RATE (ESR)      C REACTIVE PROTEIN, QT      DEANA BY MULTIPLEX FLOW IA, QL      CBC WITH AUTOMATED DIFF      METABOLIC PANEL, COMPREHENSIVE   3. Acute pain of both knees  M25.561 338.19     M25.562 719.46      Follow-up and Dispositions    · Return in about 4 weeks (around 4/7/2021) for dysphagia.        Concern for neuromuscular disorder    Discussed plan for neuro referral if above labs unrevealing     Discussed reasons for ED transfer    Encouraged trial of OTC NSAID/Acetaminophen, knee strengthening exercises         reviewed diet, exercise and weight control  reviewed medications and side effects in detail  Earnest Harada, NP

## 2021-03-10 NOTE — PROGRESS NOTES
RM:7     services used: [employer] # [id number]    Chief Complaint   Patient presents with    Headache    Dysphagia     patient states that for the past 3-4 months she feels muscle weakness on the back of her tongue and it is hard to swallow     Knee Pain     ongoing for a few months       Visit Vitals  /72 (BP 1 Location: Left arm, BP Patient Position: Sitting)   Pulse 88   Temp 97.9 °F (36.6 °C) (Oral)   Resp 14   Ht 5' 6\" (1.676 m)   Wt 165 lb (74.8 kg)   SpO2 96%   BMI 26.63 kg/m²        1. Have you been to the ER, urgent care clinic since your last visit? Hospitalized since your last visit? No    2. Have you seen or consulted any other health care providers outside of the [address] [place] [address] since your last visit? Include any pap smears or colon screening.  No

## 2021-03-11 LAB
ALBUMIN SERPL-MCNC: 4.8 G/DL (ref 3.8–4.8)
ALBUMIN/GLOB SERPL: 1.8 {RATIO} (ref 1.2–2.2)
ALP SERPL-CCNC: 60 IU/L (ref 39–117)
ALT SERPL-CCNC: 22 IU/L (ref 0–32)
ANA SER QL: NEGATIVE
AST SERPL-CCNC: 20 IU/L (ref 0–40)
BASOPHILS # BLD AUTO: 0.1 X10E3/UL (ref 0–0.2)
BASOPHILS NFR BLD AUTO: 1 %
BILIRUB SERPL-MCNC: 0.5 MG/DL (ref 0–1.2)
BUN SERPL-MCNC: 14 MG/DL (ref 6–20)
BUN/CREAT SERPL: 23 (ref 9–23)
CALCIUM SERPL-MCNC: 9.8 MG/DL (ref 8.7–10.2)
CHLORIDE SERPL-SCNC: 104 MMOL/L (ref 96–106)
CO2 SERPL-SCNC: 21 MMOL/L (ref 20–29)
CREAT SERPL-MCNC: 0.61 MG/DL (ref 0.57–1)
CRP SERPL-MCNC: 1 MG/L (ref 0–10)
EOSINOPHIL # BLD AUTO: 0.1 X10E3/UL (ref 0–0.4)
EOSINOPHIL NFR BLD AUTO: 1 %
ERYTHROCYTE [DISTWIDTH] IN BLOOD BY AUTOMATED COUNT: 12.3 % (ref 11.7–15.4)
ERYTHROCYTE [SEDIMENTATION RATE] IN BLOOD BY WESTERGREN METHOD: 23 MM/HR (ref 0–32)
EST. AVERAGE GLUCOSE BLD GHB EST-MCNC: 91 MG/DL
GLOBULIN SER CALC-MCNC: 2.7 G/DL (ref 1.5–4.5)
GLUCOSE SERPL-MCNC: 94 MG/DL (ref 65–99)
HBA1C MFR BLD: 4.8 % (ref 4.8–5.6)
HCT VFR BLD AUTO: 41.2 % (ref 34–46.6)
HGB BLD-MCNC: 14.1 G/DL (ref 11.1–15.9)
IMM GRANULOCYTES # BLD AUTO: 0 X10E3/UL (ref 0–0.1)
IMM GRANULOCYTES NFR BLD AUTO: 0 %
LYMPHOCYTES # BLD AUTO: 2.9 X10E3/UL (ref 0.7–3.1)
LYMPHOCYTES NFR BLD AUTO: 29 %
MCH RBC QN AUTO: 29.5 PG (ref 26.6–33)
MCHC RBC AUTO-ENTMCNC: 34.2 G/DL (ref 31.5–35.7)
MCV RBC AUTO: 86 FL (ref 79–97)
MONOCYTES # BLD AUTO: 0.6 X10E3/UL (ref 0.1–0.9)
MONOCYTES NFR BLD AUTO: 6 %
NEUTROPHILS # BLD AUTO: 6.3 X10E3/UL (ref 1.4–7)
NEUTROPHILS NFR BLD AUTO: 63 %
PLATELET # BLD AUTO: 361 X10E3/UL (ref 150–450)
POTASSIUM SERPL-SCNC: 4.4 MMOL/L (ref 3.5–5.2)
PROT SERPL-MCNC: 7.5 G/DL (ref 6–8.5)
RBC # BLD AUTO: 4.78 X10E6/UL (ref 3.77–5.28)
SODIUM SERPL-SCNC: 140 MMOL/L (ref 134–144)
WBC # BLD AUTO: 10 X10E3/UL (ref 3.4–10.8)

## 2021-04-20 ENCOUNTER — TELEPHONE (OUTPATIENT)
Dept: ENDOCRINOLOGY | Age: 31
End: 2021-04-20

## 2021-04-20 RX ORDER — LEVOTHYROXINE SODIUM 25 UG/1
25 TABLET ORAL
Qty: 90 TAB | Refills: 3 | Status: SHIPPED | OUTPATIENT
Start: 2021-04-20 | End: 2022-03-25 | Stop reason: ALTCHOICE

## 2021-04-20 NOTE — TELEPHONE ENCOUNTER
Message from [facility] sent at 4/20/2021  1:58 PM EDT -----  Regarding: Dr. Jose Palmer refill  Medication Refill     Caller (if not patient): n/a         Relationship of caller (if not patient): n/a         Best contact number(s):[phone]           Name of medication and dosage if known:Levothyroxine 25mg        Is patient out of this medication (yes/no): no        Pharmacy name:[address]      Pharmacy listed in chart? (yes/no): yes   Pharmacy phone number:        Details to clarify the request: She stated she will out of medication in a few days and needs enough medication to get her to her 06/07/2021 with Dr. Jackelin Aleman.  She would also like Dr. Basil Drake address and information to be mailed to her as she speaks primarily Romanian and wants to make sure she has all correct infromation.        [address] [place] [address]

## 2021-04-20 NOTE — TELEPHONE ENCOUNTER
----- Message from [address] [facility] sent at 4/20/2021  1:58 PM EDT -----  Regarding: Dr. Lewis Rosas refill  Medication Refill    Caller (if not patient): n/a       Relationship of caller (if not patient): n/a       Best contact number(s):[phone]        Name of medication and dosage if known:Levothyroxine 25mg      Is patient out of this medication (yes/no): no      Pharmacy name:[address] listed in chart? (yes/no): yes   Pharmacy phone number:      Details to clarify the request: She stated she will out of medication in a few days and needs enough medication to get her to her 06/07/2021 with Dr. Jaime Phoenix. She would also like Dr. Adam Gutiérrez address and information to be mailed to her as she speaks primarily Irish and wants to make sure she has all correct infromation.       [address] [place] [address]

## 2021-06-07 ENCOUNTER — OFFICE VISIT (OUTPATIENT)
Dept: ENDOCRINOLOGY | Age: 31
End: 2021-06-07
Payer: MEDICAID

## 2021-06-07 VITALS
DIASTOLIC BLOOD PRESSURE: 69 MMHG | BODY MASS INDEX: 31.48 KG/M2 | WEIGHT: 184.4 LBS | RESPIRATION RATE: 16 BRPM | HEART RATE: 86 BPM | HEIGHT: 64 IN | SYSTOLIC BLOOD PRESSURE: 102 MMHG | OXYGEN SATURATION: 94 %

## 2021-06-07 DIAGNOSIS — E06.3 HYPOTHYROIDISM DUE TO HASHIMOTO'S THYROIDITIS: Primary | ICD-10-CM

## 2021-06-07 DIAGNOSIS — E03.8 HYPOTHYROIDISM DUE TO HASHIMOTO'S THYROIDITIS: Primary | ICD-10-CM

## 2021-06-07 DIAGNOSIS — E06.3 HYPOTHYROIDISM DUE TO HASHIMOTO'S THYROIDITIS: ICD-10-CM

## 2021-06-07 DIAGNOSIS — E03.8 HYPOTHYROIDISM DUE TO HASHIMOTO'S THYROIDITIS: ICD-10-CM

## 2021-06-07 PROCEDURE — 99214 OFFICE O/P EST MOD 30 MIN: CPT | Performed by: INTERNAL MEDICINE

## 2021-06-07 NOTE — PROGRESS NOTES
Chief Complaint   Patient presents with    Thyroid Problem     CHIEF COMPLAINT: f/u Thyroid nodule, hashimoto thyroiditis    [phone] Carlos Peck     HISTORY OF PRESENT ILLNESS:   Armida Rodriguez is a 32 y.o. female with with minimal past medical history seen in transfer from her previous endocrinologist for f/u of a thyroid nodule and goiter. Previously with Gayedward Crater:  Patient described that she had some dysphagia felt a lump in her neck,  She reported some hair loss in addition to the mild dysphagia,  A thyroid ultrasound was obtained by her primary doctor, see below,  Family history of thyroid disorder is noted in sister, treated,  No known radiation exposure. No history of hyper or hypothyroidism. 6/2/20: Thyroid Ultrasound:    Mildly enlarged: R lobe 5.5 cm, L lobe 5.8 cm   Solitary L lower 9 mm solid thyroid nodule   No other description in report    We discussed biopsy vs surveillance US and she elected surveillance. 12/7/20: Thyroid Ultrasound   Unchanged 1.1 x 0.7 x 0.7 cm left lower nodule    She was noted for TPO+ finding,  TSH repeated now at 4.47, symptomatic    06/07/2021: Endorses dyspnea, headache. Dyspnea has occurred since prior to taking the med, for 4 years. Present at rest and on exertion. 6 months ago had blood tests. Endorses dysphagia. Not interest in pursuing fertility in next 1 year. When describing thyroid fullness points to the submandibular area. Reports that she was told that she could have her thyroid taken out previously. Has not noticed any improvement in symptoms with initiation of levothyroxine. Is requesting a medication for weight loss. ALLERGIES:   No Known Allergies    MEDICATIONS ON ADMISSION:     Current Outpatient Medications:     levothyroxine (SYNTHROID) 25 mcg tablet, Take 1 Tab by mouth Daily (before breakfast). , Disp: 90 Tab, Rfl: 3    prenatal vit-iron fumarate-fa 27 mg iron- 0.8 mg tab tablet, Take 1 Tab by mouth daily. , Disp: 30 Tab, Rfl: 3    SOCIAL HISTORY: , never smoker    FAMILY HISTORY:  Family History   Problem Relation Age of Onset    Diabetes Mother     Thyroid Disease Mother     Hypertension Father        REVIEW OF SYSTEMS: Complete ROS assessed and noted for that which is described above, all else are negative. Eyes: normal  ENT: normal  CVS: normal  Resp: dyspnea  GI: normal  : normal  GYN: normal  Endocrine: mild hair loss, goiter  Integument: normal  Musculoskeletal: normal  Neuro: normal  Psych: normal    PHYSICAL EXAMINATION:  Visit Vitals  /69   Pulse 86   Resp 16   Ht 5' 4\" (1.626 m)   Wt 184 lb 6.4 oz (83.6 kg)   SpO2 94%   BMI 31.65 kg/m²     GENERAL: NCAT, Appears well nourished  EYES: EOMI, non-icteric, no proptosis  Ear/Nose/Throat: Mild thyromegaly is present  CARDIOVASCULAR: no cyanosis, no visible JVD  RESPIRATORY: Clear to auscultation, moving air well  MUSCULOSKELETAL: Normal ROM of upper extremities observed  SKIN: No edema, rash, or other significant changes observed  NEUROLOGIC:  AAOx3  PSYCHIATRIC: Normal affect, Normal insight and judgement                ASSESSMENT AND PLAN:   Ivan Chaparro is a 32 y.o. female seen in follow-up for discussion related to subclinical hypothyroidism with positive thyroperoxidase antibodies and a thyroid nodule. She currently endorses dyspnea, dysphagia. When reviewing where the fullness is in the thyroid, she points to the submandibular area. Will obtain repeat thyroid ultrasound to ensure that it has not changed. Reviewed the fact that she has subclinical hypothyroidism and that the thyroid will reduce in size if we reduce the TSH. Reviewed the fact that her esophagus and trachea were patent on the most recent thyroid ultrasound and that the thyroid nodule that is present is roughly the size of a grain of rice.       #thyroid nodule, dysphagia/dyspnea  -By virtue of being isoechoic and less than 1.5 cm in size, this nodule does not meet 2015 American thyroid Association guidelines for fine-needle aspiration at this time  -Repeat thyroid ultrasound-if the thyroid is not causing significant tracheal compression, consider referral to pulmonology for PFTs    #Subclinical hypothyroidism with positive thyroperoxidase antibodies  -Begun on 25 mcg of levothyroxine in December 2020  - TSH 3RD GENERATION; Future  - T4, FREE; Future  -Goal TSH less than 1.5 if she were interested in pursuing fertility but she is not at this time. Copy sent to: Angel Kate NP    Return to care:  In 1 year, repeat labs 8 weeks following the most recent if levothyroxine is increased to 50 mcg

## 2021-06-08 ENCOUNTER — TELEPHONE (OUTPATIENT)
Dept: ENDOCRINOLOGY | Age: 31
End: 2021-06-08

## 2021-06-08 LAB
T4 FREE SERPL-MCNC: 1.26 NG/DL (ref 0.82–1.77)
TSH SERPL DL<=0.005 MIU/L-ACNC: 2.79 UIU/ML (ref 0.45–4.5)

## 2021-06-08 NOTE — TELEPHONE ENCOUNTER
Called [employer] [phone] Elian Perez but they are only available for scheduled:    Turns out [facility] use [employer]  services- their # is [phone]- used  to call pt. TSH 2.790   0.450 - 4.500 uIU/mL Final     T4, Free 1.26   0.82 - 1.77 ng/dL Final       A/P: continue 25mcg levothyroxine- current thyroid levels are NOT causing symptoms- we do not need to reduce the dose  Await ultrasound     Pt asked again about a med for weight loss- explained that the class of meds her insurance would pay for is not without potential consequences. Reports she has followed strict diet (keto) for 4 months and exercised but didn't lose weight. Does not count calories. Prefers to use the medication rather than to count calories. I explained that I don't like to use phentermine due to CV risks, elevated BP, is not sustainable. Explained that she should be eating 1600kcal per day or less.      Pepito Garner, [facility] [facility]

## 2021-08-16 ENCOUNTER — OFFICE VISIT (OUTPATIENT)
Dept: ENDOCRINOLOGY | Age: 31
End: 2021-08-16
Payer: MEDICAID

## 2021-08-16 ENCOUNTER — TELEPHONE (OUTPATIENT)
Dept: ENDOCRINOLOGY | Age: 31
End: 2021-08-16

## 2021-08-16 VITALS — WEIGHT: 180 LBS | BODY MASS INDEX: 30.9 KG/M2

## 2021-08-16 DIAGNOSIS — E03.8 HYPOTHYROIDISM DUE TO HASHIMOTO'S THYROIDITIS: ICD-10-CM

## 2021-08-16 DIAGNOSIS — E04.1 THYROID NODULE: ICD-10-CM

## 2021-08-16 DIAGNOSIS — E06.3 HYPOTHYROIDISM DUE TO HASHIMOTO'S THYROIDITIS: ICD-10-CM

## 2021-08-16 DIAGNOSIS — E03.8 HYPOTHYROIDISM DUE TO HASHIMOTO'S THYROIDITIS: Primary | ICD-10-CM

## 2021-08-16 DIAGNOSIS — E06.3 HYPOTHYROIDISM DUE TO HASHIMOTO'S THYROIDITIS: Primary | ICD-10-CM

## 2021-08-16 PROCEDURE — 99214 OFFICE O/P EST MOD 30 MIN: CPT | Performed by: INTERNAL MEDICINE

## 2021-08-16 NOTE — PROGRESS NOTES
Chief Complaint   Patient presents with    Thyroid Problem     No recent labs    Follow-up     Doxy: [phone]     CHIEF COMPLAINT: f/u Thyroid nodule, hashimoto thyroiditis    [phone] Carlos Peck     HISTORY OF PRESENT ILLNESS:   Nelsy Narayanan is a 32 y.o. female with with minimal past medical history seen in transfer from her previous endocrinologist for f/u of a thyroid nodule and goiter. Previously with Xiang Boss:  Patient described that she had some dysphagia felt a lump in her neck,  She reported some hair loss in addition to the mild dysphagia,  A thyroid ultrasound was obtained by her primary doctor, see below,  Family history of thyroid disorder is noted in sister, treated,  No known radiation exposure. No history of hyper or hypothyroidism. 6/2/20: Thyroid Ultrasound:    Mildly enlarged: R lobe 5.5 cm, L lobe 5.8 cm   Solitary L lower 9 mm solid thyroid nodule   No other description in report    We discussed biopsy vs surveillance US and she elected surveillance. 12/7/20: Thyroid Ultrasound   Unchanged 1.1 x 0.7 x 0.7 cm left lower nodule    She was noted for TPO+ finding,  TSH repeated now at 4.47, symptomatic    06/07/2021: Endorses dyspnea, headache. Dyspnea has occurred since prior to taking the med, for 4 years. Present at rest and on exertion. 6 months ago had blood tests. Endorses dysphagia. Not interest in pursuing fertility in next 1 year. When describing thyroid fullness points to the submandibular area. Reports that she was told that she could have her thyroid taken out previously. Has not noticed any improvement in symptoms with initiation of levothyroxine. Is requesting a medication for weight loss. 08/16/2021: Is headed to [facility]) today, they're going to [facility]. Has been taking levothyroxine 25mcg since we spoke last in June. Is running out of prescription. Endorses fullness in the neck- was never called to have the ultrasound done.  Sometimes has a headache but not all the time. No difference since starting levothyroxine. ALLERGIES:   No Known Allergies    MEDICATIONS ON ADMISSION:     Current Outpatient Medications:     levothyroxine (SYNTHROID) 25 mcg tablet, Take 1 Tab by mouth Daily (before breakfast). , Disp: 90 Tab, Rfl: 3    prenatal vit-iron fumarate-fa 27 mg iron- 0.8 mg tab tablet, Take 1 Tab by mouth daily. , Disp: 27 Tab, Rfl: 3    SOCIAL HISTORY: , never smoker    FAMILY HISTORY:  Family History   Problem Relation Age of Onset    Diabetes Mother     Thyroid Disease Mother     Hypertension Father        REVIEW OF SYSTEMS: Complete ROS assessed and noted for that which is described above, all else are negative. Eyes: normal  ENT: normal  CVS: normal  Resp: dyspnea  GI: normal  : normal  GYN: normal  Endocrine: mild hair loss, goiter  Integument: normal  Musculoskeletal: normal  Neuro: normal  Psych: normal    PHYSICAL EXAMINATION:  Visit Vitals  Wt 180 lb (81.6 kg)   BMI 30.90 kg/m²     GENERAL: NCAT, Appears well nourished  EYES: EOMI, non-icteric, no proptosis  Ear/Nose/Throat: Mild thyromegaly is present  CARDIOVASCULAR: no cyanosis, no visible JVD  RESPIRATORY: Clear to auscultation, moving air well  MUSCULOSKELETAL: Normal ROM of upper extremities observed  SKIN: No edema, rash, or other significant changes observed  NEUROLOGIC:  AAOx3  PSYCHIATRIC: Normal affect, Normal insight and judgement                ASSESSMENT AND PLAN:   Vinton Shone is a 32 y.o. female seen in follow-up for discussion related to subclinical hypothyroidism with positive thyroperoxidase antibodies and a thyroid nodule. She currently endorses continued dyspnea, dysphagia. When reviewing where the fullness is in the thyroid, she points to the submandibular area. Will obtain repeat thyroid ultrasound to ensure that it has not changed.   Reviewed the fact that she has subclinical hypothyroidism and that the thyroid will reduce in size if we reduce the TSH. Reviewed the fact that her esophagus and trachea were patent on the most recent thyroid ultrasound and that the thyroid nodule that is present is roughly the size of a grain of rice. Reassess thyroid labs prior to March visit on 25mcg of levothyroxine. #thyroid nodule, dysphagia/dyspnea  -By virtue of being isoechoic and less than 1.5 cm in size, this nodule does not meet 2015 American thyroid Association guidelines for fine-needle aspiration at this time  -Repeat thyroid ultrasound-if the thyroid is not causing significant tracheal compression, consider referral to pulmonology for PFTs    #Subclinical hypothyroidism with positive thyroperoxidase antibodies  -Begun on 25 mcg of levothyroxine in December 2020  -TSH reduced following this initiation, continue with reassessment prior to visit in March 2022  -Goal TSH less than 1.5 if she were interested in pursuing fertility but she is not at this time. Copy sent to: Carter Nettles NP    Return to care: March 25th at 9:30

## 2021-08-16 NOTE — TELEPHONE ENCOUNTER
----- Message from Octonius sent at 8/16/2021  1:19 PM EDT -----  Regarding: Dr. Lorene Kelly telephone  General Message/Vendor Calls    Caller's first and last name: n/a       Reason for call: virtual appointment      Callback required yes/no and why: yes       Best contact number(s):[phone]      Details to clarify the request: Patient would like to be contacted to see if her appointment can be virtual today [place].       [address]

## 2021-08-16 NOTE — TELEPHONE ENCOUNTER
Spoke with pt to make her aware that virtual appt will be fine only if she has access to a device that allows video calls.

## 2021-08-27 ENCOUNTER — TELEPHONE (OUTPATIENT)
Dept: ENDOCRINOLOGY | Age: 31
End: 2021-08-27

## 2021-08-27 NOTE — TELEPHONE ENCOUNTER
----- Message from Wanmatilde Bowenstorito sent at 2021 12:28 PM EDT -----  Regarding: MD Ferrell/ refill  Medication Refill      Patient's first and last name:  Ja Weldon   :   1990    Caller (if not patient): pt    Relationship of caller (if not patient):   pt    Best contact number(s):  [phone]    Name of medication and dosage if known:   levothyroxine (SYNTHROID) 25 mcg tablet    Is patient out of this medication (yes/no): yes     Pharmacy name:  [id number] listed in chart? (yes/no): yes    Pharmacy phone number: n/a    Details to clarify the request: Pt would like to request medication to pharmacy on file. Pt is currently out of medication.

## 2021-08-27 NOTE — TELEPHONE ENCOUNTER
I called the pharmacy to check on this because Dr. Venessa Khan had sent in a script on 4/20/2021 for 90 days with 3 refills. The pharmacist said she had the 3 refills remaining. I asked her to go ahead and fill this and they will let Ms. Al Echavarria know when it is ready.   Celso Grady

## 2022-03-17 ENCOUNTER — HOSPITAL ENCOUNTER (OUTPATIENT)
Dept: ULTRASOUND IMAGING | Age: 32
Discharge: HOME | End: 2022-03-17
Attending: INTERNAL MEDICINE
Payer: MEDICAID

## 2022-03-17 DIAGNOSIS — E03.8 HYPOTHYROIDISM DUE TO HASHIMOTO'S THYROIDITIS: ICD-10-CM

## 2022-03-17 DIAGNOSIS — E06.3 HYPOTHYROIDISM DUE TO HASHIMOTO'S THYROIDITIS: ICD-10-CM

## 2022-03-17 PROCEDURE — 76536 US EXAM OF HEAD AND NECK: CPT

## 2022-03-17 NOTE — LETTER
3/17/2022    Ms. [address] [facility]      Dear Sebastian Jeny:    Please find your most recent results below. Resulted Orders   US THYROID/PARATHYROID/SOFT TISS    Narrative    INDICATION: Hypothyroidism due to Hashimoto's thyroiditis. To assess thyroid  size     EXAM: Thyroid Sonogram.    COMPARISON: 12/7/2020. FINDINGS:   The thyroid gland is normal in echotexture and uniform other than a stable left  lobe lower pole nodule measuring 1.1 x 0.8 x 0.6 cm. Right lobe measures approximately 5.0 x 2.0 x 1.7 cm. Left lobe measures approximately 5.9 x 1.7 x 1.6 cm. There is no significant anterior cervical adenopathy. Impression    Stable exam including mild thyromegaly and a small nodule. RECOMMENDATIONS:    Your thyroid nodule is stable, fine needle aspiration is not necessary at this time.      Please call me if you have any questions: [phone]    Sincerely,      Leonardo Delacruz MD

## 2022-03-18 LAB
T4 FREE SERPL-MCNC: 1.23 NG/DL (ref 0.82–1.77)
TSH SERPL DL<=0.005 MIU/L-ACNC: 4.55 UIU/ML (ref 0.45–4.5)

## 2022-03-20 PROBLEM — Z98.891 PREVIOUS CESAREAN SECTION: Status: ACTIVE | Noted: 2020-03-02

## 2022-03-25 ENCOUNTER — OFFICE VISIT (OUTPATIENT)
Dept: ENDOCRINOLOGY | Age: 32
End: 2022-03-25
Payer: MEDICAID

## 2022-03-25 VITALS
RESPIRATION RATE: 16 BRPM | HEART RATE: 86 BPM | BODY MASS INDEX: 33.01 KG/M2 | OXYGEN SATURATION: 98 % | HEIGHT: 63 IN | DIASTOLIC BLOOD PRESSURE: 63 MMHG | SYSTOLIC BLOOD PRESSURE: 95 MMHG | WEIGHT: 186.3 LBS

## 2022-03-25 DIAGNOSIS — E03.8 HYPOTHYROIDISM DUE TO HASHIMOTO'S THYROIDITIS: Primary | ICD-10-CM

## 2022-03-25 DIAGNOSIS — E06.3 HYPOTHYROIDISM DUE TO HASHIMOTO'S THYROIDITIS: Primary | ICD-10-CM

## 2022-03-25 DIAGNOSIS — E06.3 HYPOTHYROIDISM DUE TO HASHIMOTO'S THYROIDITIS: ICD-10-CM

## 2022-03-25 DIAGNOSIS — E04.1 THYROID NODULE: ICD-10-CM

## 2022-03-25 DIAGNOSIS — E03.8 HYPOTHYROIDISM DUE TO HASHIMOTO'S THYROIDITIS: ICD-10-CM

## 2022-03-25 PROCEDURE — 99214 OFFICE O/P EST MOD 30 MIN: CPT | Performed by: INTERNAL MEDICINE

## 2022-03-25 RX ORDER — LEVOTHYROXINE SODIUM 50 UG/1
50 TABLET ORAL
Qty: 90 TABLET | Refills: 3 | Status: SHIPPED | OUTPATIENT
Start: 2022-03-25 | End: 2022-10-25 | Stop reason: SDUPTHER

## 2022-03-25 NOTE — PROGRESS NOTES
No chief complaint on file. CHIEF COMPLAINT: f/u Thyroid nodule, hashimoto thyroiditis    [phone] Carlos Peck     HISTORY OF PRESENT ILLNESS:   Nayely Pino is a 28 y.o. female with with minimal past medical history seen in transfer from her previous endocrinologist for f/u of a thyroid nodule and goiter. Previously with Jacqueline Kirkpatrick:  Patient described that she had some dysphagia felt a lump in her neck,  She reported some hair loss in addition to the mild dysphagia,  A thyroid ultrasound was obtained by her primary doctor, see below,  Family history of thyroid disorder is noted in sister, treated,  No known radiation exposure. No history of hyper or hypothyroidism. 6/2/20: Thyroid Ultrasound:    Mildly enlarged: R lobe 5.5 cm, L lobe 5.8 cm   Solitary L lower 9 mm solid thyroid nodule   No other description in report    We discussed biopsy vs surveillance US and she elected surveillance. 12/7/20: Thyroid Ultrasound   Unchanged 1.1 x 0.7 x 0.7 cm left lower nodule    She was noted for TPO+ finding,  TSH repeated now at 4.47, symptomatic    06/07/2021: Endorses dyspnea, headache. Dyspnea has occurred since prior to taking the med, for 4 years. Present at rest and on exertion. 6 months ago had blood tests. Endorses dysphagia. Not interest in pursuing fertility in next 1 year. When describing thyroid fullness points to the submandibular area. Reports that she was told that she could have her thyroid taken out previously. Has not noticed any improvement in symptoms with initiation of levothyroxine. Is requesting a medication for weight loss. 08/16/2021: Is headed to [facility]) today, they're going to visit [facility]. Has been taking levothyroxine 25mcg since we spoke last in June. Is running out of prescription. Endorses fullness in the neck- was never called to have the ultrasound done. Sometimes has a headache but not all the time. No difference since starting levothyroxine.     March 25, 2022: Reports difficulty losing weight despite eating 1600 a[address]ander a day and exercising. Otherwise no changes. Not actively attempting to conceive. ALLERGIES:   No Known Allergies    MEDICATIONS ON ADMISSION:     Current Outpatient Medications:     levothyroxine (SYNTHROID) 50 mcg tablet, Take 1 Tablet by mouth Daily (before breakfast). , Disp: 90 Tablet, Rfl: 3    prenatal vit-iron fumarate-fa 27 mg iron- 0.8 mg tab tablet, Take 1 Tab by mouth daily. (Patient not taking: Reported on 3/25/2022), Disp: 27 Tab, Rfl: 3    SOCIAL HISTORY: , never smoker    FAMILY HISTORY:  Family History   Problem Relation Age of Onset    Diabetes Mother     Thyroid Disease Mother     Hypertension Father        REVIEW OF SYSTEMS: See HPI    PHYSICAL EXAMINATION:  Visit Vitals  BP 95/63   Pulse 86   Resp 16   Ht 5' 3\" (1.6 m)   Wt 186 lb 4.8 oz (84.5 kg)   SpO2 98%   BMI 33.00 kg/m²     GENERAL: NCAT, Appears well nourished  EYES: EOMI, non-icteric, no proptosis  Ear/Nose/Throat: Mild thyromegaly is present  CARDIOVASCULAR: no cyanosis, no visible JVD  RESPIRATORY: Clear to auscultation, moving air well  MUSCULOSKELETAL: Normal ROM of upper extremities observed  SKIN: No edema, rash, or other significant changes observed  NEUROLOGIC:  AAOx3  PSYCHIATRIC: Normal affect, Normal insight and judgement                    ASSESSMENT AND PLAN:   David Hawk is a 28 y.o. female seen in follow-up for discussion related to subclinical hypothyroidism with positive thyroperoxidase antibodies and a thyroid nodule. She previously endorsed dyspnea, dysphagia. When reviewing where the fullness is in the thyroid, she points to the submandibular area. Reviewed the fact that her esophagus and trachea were patent on the most recent thyroid ultrasound and that the thyroid nodule that is present is roughly the size of a grain of rice. Increase levothyroxine to 50 mcg given TSH with 25 mcg.   Nodule is unchanged, remains isoechoic. #thyroid nodule, dysphagia/dyspnea  -By virtue of being isoechoic and less than 1.5 cm in size, this nodule does not meet 2015 American thyroid Association guidelines for fine-needle aspiration at this time  -Repeat thyroid ultrasound in 1 or 2 years    #Subclinical hypothyroidism with positive thyroperoxidase antibodies  -Begun on 25 mcg of levothyroxine in December 2020, increased to 50 mcg March 2022  -Repeat TSH and free T4 in 8 weeks  -Goal TSH less than 1.5 if she were interested in pursuing fertility but she is not at this time. Copy sent to: Mariaelena Veronica NP    Return to care: 6 months

## 2022-06-28 ENCOUNTER — OFFICE VISIT (OUTPATIENT)
Dept: INTERNAL MEDICINE CLINIC | Age: 32
End: 2022-06-28
Payer: MEDICAID

## 2022-06-28 VITALS
HEIGHT: 66 IN | RESPIRATION RATE: 16 BRPM | BODY MASS INDEX: 29.73 KG/M2 | WEIGHT: 185 LBS | HEART RATE: 75 BPM | DIASTOLIC BLOOD PRESSURE: 71 MMHG | OXYGEN SATURATION: 98 % | TEMPERATURE: 97.7 F | SYSTOLIC BLOOD PRESSURE: 103 MMHG

## 2022-06-28 DIAGNOSIS — E66.3 OVERWEIGHT (BMI 25.0-29.9): ICD-10-CM

## 2022-06-28 DIAGNOSIS — Z11.59 NEED FOR HEPATITIS C SCREENING TEST: ICD-10-CM

## 2022-06-28 DIAGNOSIS — R53.83 FATIGUE, UNSPECIFIED TYPE: ICD-10-CM

## 2022-06-28 DIAGNOSIS — H61.22 EXCESSIVE CERUMEN IN LEFT EAR CANAL: ICD-10-CM

## 2022-06-28 DIAGNOSIS — E03.9 ACQUIRED HYPOTHYROIDISM: Primary | ICD-10-CM

## 2022-06-28 DIAGNOSIS — Z00.00 PHYSICAL EXAM, ANNUAL: ICD-10-CM

## 2022-06-28 LAB
BILIRUB UR QL STRIP: NEGATIVE
GLUCOSE UR-MCNC: NEGATIVE MG/DL
HCG URINE, QL. (POC): NEGATIVE
KETONES P FAST UR STRIP-MCNC: NEGATIVE MG/DL
PH UR STRIP: 5.5 [PH] (ref 4.6–8)
PROT UR QL STRIP: NEGATIVE
SP GR UR STRIP: 1.01 (ref 1–1.03)
UA UROBILINOGEN AMB POC: NORMAL (ref 0.2–1)
URINALYSIS CLARITY POC: CLEAR
URINALYSIS COLOR POC: YELLOW
URINE BLOOD POC: NORMAL
URINE LEUKOCYTES POC: NEGATIVE
URINE NITRITES POC: NEGATIVE
VALID INTERNAL CONTROL?: YES

## 2022-06-28 PROCEDURE — 69209 REMOVE IMPACTED EAR WAX UNI: CPT | Performed by: NURSE PRACTITIONER

## 2022-06-28 PROCEDURE — 81025 URINE PREGNANCY TEST: CPT | Performed by: NURSE PRACTITIONER

## 2022-06-28 PROCEDURE — 99395 PREV VISIT EST AGE 18-39: CPT | Performed by: NURSE PRACTITIONER

## 2022-06-28 PROCEDURE — 81002 URINALYSIS NONAUTO W/O SCOPE: CPT | Performed by: NURSE PRACTITIONER

## 2022-06-28 NOTE — PROGRESS NOTES
Subjective  Warren Paniagua is a 28 y.o. female. Language line Persian speaking  Polina Underwood facilitates   HPI   She has been doing well   She sees endocrinologist  for thyroid disorder. Next appointment in September     She is physically active; walks for > 30 minutes several times a week         Hands shaking   Feels tired   Symptoms for 2 weeks   No change in medication diet or lifestyle     When she went to ED she was told left ear is clogged. She requests irrigation     Follows a healthy diet and eating less but gaining weight   Facial hair    Abdomen distended     See Gyn provider   DEBBIEMP 1 week  No contraceptive   Condoms       She requests opinion on weight loss medication, Orlistat       Past Medical History:   Diagnosis Date    Subchorionic hemorrhage in first trimester 08/26/2019     Current Outpatient Medications   Medication Sig    Orlistat 60 mg capsule Take 60 mg by mouth three (3) times daily (with meals).  levothyroxine (SYNTHROID) 50 mcg tablet Take 1 Tablet by mouth Daily (before breakfast). No current facility-administered medications for this visit. No Known Allergies  Review of Systems   Constitutional: Negative. HENT: Negative. Eyes: Negative. Respiratory: Negative. Cardiovascular: Negative for chest pain and leg swelling. Gastrointestinal: Negative. Genitourinary: Negative. Musculoskeletal: Negative. Skin: Negative. Neurological: Negative for dizziness and headaches. Psychiatric/Behavioral: Negative. Objective  Visit Vitals  /71 (BP 1 Location: Left arm, BP Patient Position: Sitting, BP Cuff Size: Adult)   Pulse 75   Temp 97.7 °F (36.5 °C) (Oral)   Resp 16   Ht 5' 6\" (1.676 m)   Wt 185 lb (83.9 kg)   SpO2 98%   BMI 29.86 kg/m²     Physical Exam  Constitutional:       Appearance: Normal appearance. HENT:      Head: Normocephalic and atraumatic.       Right Ear: Tympanic membrane, ear canal and external ear normal.      Left Ear: Tympanic membrane, ear canal and external ear normal. There is impacted cerumen. Ears:      Comments: Complete left ear disimpaction with manual irrigation      Nose: No congestion or rhinorrhea. Eyes:      Conjunctiva/sclera: Conjunctivae normal.   Neck:      Thyroid: Thyromegaly present. No thyroid mass or thyroid tenderness. Cardiovascular:      Rate and Rhythm: Normal rate and regular rhythm. Pulses: Normal pulses. Pulmonary:      Effort: Pulmonary effort is normal.      Breath sounds: Normal breath sounds. Abdominal:      General: Bowel sounds are normal. There is no distension. Palpations: Abdomen is soft. There is no mass. Tenderness: There is no abdominal tenderness. There is no right CVA tenderness, left CVA tenderness, guarding or rebound. Hernia: No hernia is present. Musculoskeletal:      Cervical back: Normal range of motion and neck supple. No tenderness. Right lower leg: No edema. Left lower leg: No edema. Lymphadenopathy:      Cervical: No cervical adenopathy. Skin:     General: Skin is warm and dry. Findings: No rash. Neurological:      General: No focal deficit present. Mental Status: She is alert. Mental status is at baseline. Psychiatric:         Mood and Affect: Mood normal.         Behavior: Behavior normal.         Thought Content: Thought content normal.          Assessment & Plan    ICD-10-CM ICD-9-CM    1. Acquired hypothyroidism  E03.9 244.9 TSH 3RD GENERATION      T4, FREE      T4, FREE      TSH 3RD GENERATION   2. Physical exam, annual  Z00.00 V70.0 CBC WITH AUTOMATED DIFF      METABOLIC PANEL, COMPREHENSIVE      AMB POC URINALYSIS DIP STICK MANUAL W/O MICRO      AMB POC URINE PREGNANCY TEST, VISUAL COLOR COMPARISON      METABOLIC PANEL, COMPREHENSIVE      CBC WITH AUTOMATED DIFF   3. Need for hepatitis C screening test  Z11.59 V73.89    4.  Fatigue, unspecified type  R53.83 780.79 CBC WITH AUTOMATED DIFF      METABOLIC PANEL, COMPREHENSIVE      HEMOGLOBIN A1C WITH EAG      AMB POC URINALYSIS DIP STICK MANUAL W/O MICRO      AMB POC URINE PREGNANCY TEST, VISUAL COLOR COMPARISON      HEMOGLOBIN A1C WITH EAG      METABOLIC PANEL, COMPREHENSIVE      CBC WITH AUTOMATED DIFF   5. Overweight (BMI 25.0-29. 9)  E66.3 278.02 Orlistat 60 mg capsule   6. Excessive cerumen in left ear canal  H61.22 380.4 REMOVE IMPACTED EAR WAX     Diagnoses and all orders for this visit:    1. Acquired hypothyroidism  -     TSH 3RD GENERATION; Future  -     T4, FREE; Future    2. Physical exam, annual  -     CBC WITH AUTOMATED DIFF; Future  -     METABOLIC PANEL, COMPREHENSIVE; Future  -     AMB POC URINALYSIS DIP STICK MANUAL W/O MICRO  -     AMB POC URINE PREGNANCY TEST, VISUAL COLOR COMPARISON    3. Need for hepatitis C screening test    4. Fatigue, unspecified type  -     CBC WITH AUTOMATED DIFF; Future  -     METABOLIC PANEL, COMPREHENSIVE; Future  -     HEMOGLOBIN A1C WITH EAG; Future  -     AMB POC URINALYSIS DIP STICK MANUAL W/O MICRO  -     AMB POC URINE PREGNANCY TEST, VISUAL COLOR COMPARISON    5. Overweight (BMI 25.0-29.9)  -     Orlistat 60 mg capsule; Take 60 mg by mouth three (3) times daily (with meals). 6. Excessive cerumen in left ear canal  -     REMOVE IMPACTED EAR WAX      Follow-up and Dispositions    · Return in about 6 months (around 12/28/2022) for weight management. advise weight loss medication likely non formulary and of similar OTC medication. Encouraged to take a fat soluble vitamins if she starts medication.  Encouraged regular exercise, balanced diet, adequate nutrition       reviewed diet, exercise and weight control  reviewed medications and side effects in detail  Natacha Núñez NP

## 2022-06-28 NOTE — PROGRESS NOTES
Identified pt with two pt identifiers(name and ). Chief Complaint   Patient presents with    Annual Wellness Visit      Vitals:    22 1538   BP: 103/71   Pulse: 75   Resp: 16   Temp: 97.7 °F (36.5 °C)   TempSrc: Oral   SpO2: 98%   Weight: 185 lb (83.9 kg)   Height: 5' 6\" (1.676 m)   PainSc:   0 - No pain      Health Maintenance Due   Topic    Hepatitis C Screening     COVID-19 Vaccine (1)    DTaP/Tdap/Td series (1 - Tdap)    Cervical cancer screen     Depression Screen        Depression Screening:  :     3 most recent PHQ Screens 2022   Little interest or pleasure in doing things Not at all   Feeling down, depressed, irritable, or hopeless Not at all   Total Score PHQ 2 0        Fall Risk Assessment:  :     No flowsheet data found. Abuse Screening:  :     No flowsheet data found. Coordination of Care Questionnaire:  :     1. Have you been to the ER, urgent care clinic since your last visit? Hospitalized since your last visit? Yes Positive for Covid [facility]     2. Have you seen or consulted any other health care providers outside of the [address] [place] [address] since your last visit? Include any pap smears or colon screening.    No    Asmaaa [id number]

## 2022-06-28 NOTE — PATIENT INSTRUCTIONS
ÞÕæÑ ÇáÏÑÞíÉ: ÅÑÔÇÏÇÊ ÇáÑÚÇíÉ  Hypothyroidism: Care Instructions  ÅÑÔÇÏÇÊ ÇáÑÚÇíÉ ÇáÎÇÕÉ Èß  ÃäÊ ãÕÇÈ ÈÞÕæÑ ÇáÏÑÞíÉ¡ íÚäí Ðáß Ãä ÌÓãß áÇ íÝÑÒ ãÇ íßÝí ãä ÇáåÑãæä ÇáÏÑÞí. íÓÇÚÏ åÐÇ ÇáåÑãæä Ýí ÇÓÊÎÏÇã ÌÓãß ááØÇÞÉ. ÅÐÇ ßÇä ãÓÊæì ÇáÏÑÞíÉ áÏíß ãäÎÝÖðÇ¡ ÝÞÏ ÊÔÚÑ WDSNQC¡ Ãæ ÊÕÇÈ CRTELVVN¡ Ãæ íÒíÏ ÖÛØ ÇáÏãÇÁ áÏíß¡ Ãæ ÊÕÈÍ ÈÔÑÊß ÌÇÝÉ¡ Ãæ ÊÚÇäí ãä ãÔßáÇÊ Ýí ÇáÐÇßÑÉ. ßãÇ ÞÏ ÊõÕÇÈ ÈÇáÈÑÏ ÈÓåæáÉ¡ ÍÊì ÚäÏãÇ íßæä ÇáØÞÓ ÏÇÝÆðÇ. ÈÇáäÓÈÉ ááÓíÏÇÊ ÇááæÇÊí íßæä ãÓÊæì ÇáÏÑÞíÉ ãäÎÝÖðÇ áÏíåä ÞÏ Êßæä ÇáÏæÑÉ ÇáÔåÑíÉ áÏíåä ÛÒíÑÉ. íÊã ÇÓÊÎÏÇã ÝÍÕ Ïã áãÚÑÝÉ ãÓÊæì ÇáåÑãæä ÇáãäÈå ááÏÑÞíÉ (TSH) ááÊÍÞÞ ãä æÌæÏ ÞÕæÑ ÇáÏÑÞíÉ. ÞÏ íÚäí ãÓÊæì TSH ÇáãÑÊÝÚ Ãä ÇáÏÑÞíÉ áÏíß ãäÎÝÖÉ. ÚäÏãÇ íßæä ÌÓãß áÇ íÝÑÒ ãÇ íßÝí ãä ÇáåÑãæä ÇáÏÑÞí¡ ÊÑÊÝÚ ãÓÊæíÇÊ TSH Ýí ãÍÇæáÉ áÏÝÚ ÇáÌÓã áÅäÊÇÌ ÇáãÒíÏ. ÚáÇÌ ÞÕæÑ ÇáÏÑÞíÉ åæ ÊäÇæá ÃÞÑÇÕ ÇáåÑãæä ÇáÏÑÞí. íÌÈ Ãä ÊÔÚÑ ÈÇáÊÍÓä Ýí ÛÖæä ÃÓÈæÚ Ãæ ÇËäíä. áßä íãßä Ãä íÓÊÛÑÞ ÇáÃãÑ ÚÏÉ ÃÔåÑ áÊÌÏ ÊÛíÑÇÊ Ýí ãÓÊæì TSH. ÓÊÍÊÇÌ Åáì ÇáÞíÇã ÈÒíÇÑÇÊ ãäÊÙãÉ áØÈíÈß ááÊÃßÏ Ãäß ÊÊäÇæá ÇáÌÑÚÉ ÇáÕÍíÍÉ ãä ÇáÏæÇÁ. ÃÛáÈ ÇáÃÔÎÇÕ íÍÊÇÌæä Åáì ÇáÇÓÊãÑÇÑ Úáì ÚáÇÌ áÈÞíÉ ÍíÇÊåã. ÓíÊÚíä Úáíß ÒíÇÑÉ ØÈíÈß ÈÇäÊÙÇã áÅÌÑÇÁ ÇÎÊÈÇÑÇÊ ÇáÏãÇÁ æÇáÊÃßÏ Ãäß ÈÍÇáÉ ÌíÏÉ. ÊõÚÏ ÑÚÇíÉ ÇáãÊÇÈÚÉ ÌÒÁðÇ ÃÓÇÓíðÇ Ýí ÚáÇÌß æÓáÇãÊß. ÝÚáíß ÇáÍÑÕ Úáì ÊÑÊíÈ ÌãíÚ ãæÇÚíÏ ÒíÇÑÉ ÇáØÈíÈ AEEOKCBIJ ÈåÇ¡ BYSOZWQU ÈØÈíÈß ÚäÏ FGRHQVCH ãä Ãí ãÔßáÇÊ. æãä ÇáÌíÏ ÃíÖðÇ Ãä ÊÚÑÝ äÊÇÆÌ DXIJMEDD æßÐáß OXBDYMTU ÈÞÇÆãÉ ÇáÃÏæíÉ ÇáÊí FBYXKTLO. ßíÝ íãßäß ÇáÇÚÊäÇÁ ÈäÝÓß Ýí PZSOSZ¿   ÊäÇæá ÃÏæíÉ ÇáåÑãæä ÇáÏÑÞí ÊãÇãðÇ ÍÓÈ ÊæÌíåÇÊ ÇáØÈíÈ. ÇÊÕá ÈÇáØÈíÈ ÅÐÇ ßäÊ ÊÚÊÞÏ Ãäß ÊæÇÌå ãÔßáÉ ÊÊÚáÞ ÈÇáÏæÇÁ. ÃÛáÈ ÇáÃÔÎÇÕ áÇ íÍÏË áÏíåã ÂËÇÑ ÌÇäÈíÉ ÅÐÇ ÊäÇæáæÇ ÇáßãíÉ ÇáÕÍíÍÉ ãä ÇáÏæÇÁ ÈÇäÊÙÇã. o ÊäÇæá ÇáÏæÇÁ ÞÈá 30 ÏÞíÞÉ ãä ÇáÅÝØÇÑ¡ æáÇ RYBQZZI ãÚ ÇáßÇáÓíæã¡ Ãæ ÇáÝíÊÇãíäÇÊ¡ Ãæ ÇáÍÏíÏ. o áÇ ÊÊäÇæá ÌÑÚÇÊ ÅÖÇÝíÉ ãä ÏæÇÁ ÇáÏÑÞíÉ ÇáÎÇÕ Èß. áä íÓÇÚÏß Ðáß Ýí ÇáÊÍÓä ÃÓÑÚ Úáì RUUSBNT¡ æÞÏ íÊÓÈÈ Ðáß Ýí ÍÏæË ÂËÇÑ ÌÇäÈíÉ. o ÅÐÇ äÓíÊ ÊäÇæá ÌÑÚÉ¡ ÝáÇ ÊÊäÇæá ÌÑÚÊíä ãä ÇáÏæÇÁ äåÇÆíðÇ. ÊäÇæá ÌÑÚÊß ÇáÚÇÏíÉ Ýí Çáíæã ÇáÊÇáí.    ÃÎÈÑ ØÈíÈß Èßá ÇáãäÊÌÇÊ ÇáÊí ÊäÇæáÊåÇ ÓæÇÁ ÈæÕÝÉ ØÈíÉ¡ Ãæ ãäÊÌÇÊ ÇáÃÚÔÇÈ¡ Ãæ ÇáãÊÇÍÉ ÈÏæä æÕÝÉ ØÈíÉ.  ÇÚÊä ÈäÝÓß. ÇÊÈÚ ÍãíÉ ÛÐÇÆíÉ ÕÍíÉ¡ æÇÍÕá Úáì Çáäæã ÇáßÇÝí¡ æãÇÑÓ ÊãÇÑíä ÈÇäÊÙÇã. ãÊì íäÈÛí áß OWPPVFY áØáÈ ÇáãÓÇÚÏÉ¿  íÊã UDOLBJG ÈÇáÑÞã 911 ÚäÏ ÇáÇÚÊÞÇÏ Ãäß ÈÍÇÌÉ Åáì ÑÚÇíÉ ØÇÑÆÉ. Úáì ÓÈíá XOTMCQ¡ ÇÊÕá Ýí GECDJWA ÇáÊÇáíÉ:   ÅÐÇ ÃÕÈÊ ÈÇáÅÛãÇÁ (ÝÞÏÇä ÇáæÚí).  ÅÐÇ ßäÊ ÊÚÇäí ãä ÕÚæÈÉ ÔÏíÏÉ Ýí ÇáÊäÝÓ.  ÅÐÇ ßÇä ÇáäÈÖ áÏíß ÔÏíÏ ÇáÈØÁ (ÃÞá ãä 60 äÈÖÉ Ýí ÇáÏÞíÞÉ).  ÅÐÇ ßÇäÊ ÏÑÌÉ ÍÑÇÑÉ ÌÓãß ãäÎÝÖÉ (95 ÏÑÌÉ ÝåÑäåÇíÊ Ãæ ÃÞá). Úáíß ÇáÇÊÕÇá ÈØÈíÈß Úáì ÇáÝæÑ Ãæ ØáÈ ÑÚÇíÉ ØÈíÉ ÝæÑíÉ Ýí WVJIHJP ÇáÊÇáíÉ:   ÅÐÇ ßäÊ ÊÔÚÑ ÈÅÌåÇÏ¡ Ãæ Îãæá¡ Ãæ ÖÚÝ.  ÅÐÇ ßÇäÊ áÏíß ãÔßáÉ Ýí ÊÐßÑ ÇáÃÔíÇÁ Ãæ Ýí ÇáÊÑßíÒ.  ÅÐÇ áã ÊÔÚÑ ÈÊÍÓä ÈÚÏ ÃÓÈæÚíä ãä ÈÏÁ ÊäÇæá ÇáÏæÇÁ. Úáíß ãÑÇÞÈÉ Ãí ÊÛíÑÇÊ ÊØÑÃ Úáì ÕÍÊß Úä ßËÈ YAKOQY Úáì TUFSBCT ÈØÈíÈß ÅÐÇ ßÇäÊ áÏíß Ãí ãÔßáÉ. Ãíä íãßäß ãÚÑÝÉ ÇáãÒíÏ¿  ÇäÊÞÇá Åáì   http://[email or web address]  Janae Chávez.Tao Ýí ãÑÈÚ ÇáÈÍË áãÚÑÝÉ ÇáãÒíÏ Íæá \"ÞÕæÑ ÇáÏÑÞíÉ: ÅÑÔÇÏÇÊ ÇáÑÚÇíÉ. \"  Wynonia Cola ZZMAQBHJ ãä: 28 ÊãæÒ                äÓÎÉ QJQVPY.2  © [id number] [employer], Incorporated. Êã ÊÚÏíá ÅÑÔÇÏÇÊ ÇáÑÚÇíÉ ÈãæÌÈ ÊÑÎíÕ ÕÇÏÑ ãä ÇÎÊÕÇÕí ÇáÑÚÇíÉ ÇáÕÍíÉ ÇáÎÇÕ Èß. ÅÐÇ ßÇäÊ áÏíß ÃÓÆáÉ WCNCO ÈÍÇáÉ ãÑÖíÉ Ãæ ÈåÐå ÇáÊÚáíãÇÊ¡ ÝÇÍÑÕ Úáì ÇáÑÌæÚ ÏÇÆãðÇ Åáì ÇÎÊÕÇÕí ÇáÑÚÇíÉ ÇáÕÍíÉ. ÊõÎáí ÔÑßÉ 5173.comwise ZHARNNEML Úä Ãí ÖãÇä Ãæ ÇáÊÒÇã íÊÚáÞ PRZACNPMJ áåÐå MLNWWOIKY. ÒíÇÑÉ ÇáÝÍÕ ÇáØÈí ÇáÚÇã¡ ãä Óä 18 ÚÇãðÇ Åáì 50 ÚÇãðÇ: ÅÑÔÇÏÇÊ ÇáÑÚÇíÉ  Well Visit, Ages 25 to 48: Care Instructions  äÙÑÉ ÚÇãÉ  íãßä Ãä ÊÓÇÚÏß ÒíÇÑÇÊ ÇáÝÍÕ Úáì ÇáÍÝÇÙ Úáì ÕÍÊß? . ÝÍÕ ØÈíÈß ÕÍÊß ÇáÚÇãÉ æÑÈãÇ ÇÞÊÑÍ ØÑÞÇ ááÚäÇíÉ ÈäÝÓß ÌíÏÇ? Helga Sida ÞÏ íæÕí ØÈíÈß ÃíÖÇ ÈÅÌÑÇÁ QMILZBUHWR Ýí ÇáÈíÊ¡ íãßäß ÇáãÓÇÚÏÉ Ýí ÇáæÞÇíÉ ãä ÇáãÑÖ Úä ØÑíÞ ÊäÇæá ÇáØÚÇã ÇáÕÍí æããÇÑÓÉ ÇáÊãÇÑíä ÇáÑíÇÖíÉ ÈÇäÊÙÇã æÛíÑåÇ ãä ÇáÎØæÇÊ? .  ßíÝ ÊÚÊäí ÈäÝÓß Ýí HOCNKS¿   ÇÎÖÚ HFHWPVDMN ÇáÝÍÕ ÇáÊí ÊÞÑÑåÇ ÃäÊ æØÈíÈß? Helga Sida íÓÇÚÏ ÇáÝÍÕ Ýí ßÔÝ DLJTDBN ÞÈá ÙåæÑ Ãí ÃÚÑÇÖ? Helga Sida    ÊäÇæá ÇáÃØÚãÉ ÇáÕÍíÉ ÇÎÊÑ ÇáÝæÇßå¡ PWQVWVDHD¡ TUESTWW AJFBZIP¡ QRXBFJBPH¡ PCJDPHU POZWMSL ÞáíáÉ ÇáÏÓã? Lorella Media ÇáÏåæä¡ æÎÇÕÉ ÇáÏåæä ÇáãÔÈÚÉ? Sahara Dillonks ÇáãáÍ Ýí ÇáäÙÇã ÇáÛÐÇÆí? Melissa Quest ãä SAUGTB? Chon Lee ÅÐÇ ßäÊ ÑÌáÇ¡ áÇ ÊÔÑÈ ÃßËÑ ãä ãÔÑæÈíä Ýí Çáíæã Ãæ 14 ãÔÑæÈÇ Ýí ÇáÃÓÈæÚ? Chon Lee ÅÐÇ ßäÊ ÇãÑÃÉ¡ áÇ ÊÔÑÈí ÃßËÑ ãä ãÔÑæÈ æÇÍÏ Ýí Çáíæã Ãæ 7 ãÔÑæÈÇÊ Ýí ÇáÃÓÈæÚ? Chon Lee  ãÇÑÓ 30 ÏÞíÞÉ Úáì ÇáÃÞá ãä ÇáäÔÇØ ÇáÈÏäí Ýí ãÚÙã ÃíÇã ÇáÃÓÈæÚ? Lianet Lugo ÎíÇÑ ÍÓä? ÞÏ ÊÑÛÈ ÃíÖÇ Ýí ããÇÑÓÉ ÃäÔØÉ ÃÎÑì¡ ãËá ÇáÌÑí Ãæ SBVNFYM Ãæ ÑßæÈ ÇáÏÑÇÌÇÊ Ãæ áÚÈ ÇáÊäÓ Ãæ ÇáÑíÇÖÇÊ ÇáÌãÇÚíÉ? Chon Lee äÇÞÔ Ãí ÊÛííÑÇÊ Úáì ÈÑäÇãÌ ÇáÊãÑíä ãÚ ØÈíÈß? .   ÇÈáÛ æÍÇÝÙ Úáì æÒä ÕÍí ÓíÄÏí Ðáß Åáì ÊÞáíá ÎØÑ ÇáÅÕÇÈÉ ÈÇáÚÏíÏ ãä KYPDIJK¡ ãËá ÇáÓãäÉ æÇáÓßÑí æÃãÑÇÖ ÇáÞáÈ æÇÑÊÝÇÚ ÖÛØ ÇáÏã? Chon Lee  áÇ ÊÏÎä Ãæ ÊÓãÍ ááÂÎÑíä ÈÇáÊÏÎíä ãä Íæáß? Chon Lee ÅÐÇ ÇÍÊÌÊ Åáì ãÓÇÚÏÉ ááÅÞáÇÚ Úä ÇáÊÏÎíä¡ ÝÊÍÏË Åáì ØÈíÈß Úä ÈÑÇãÌ ÇáÅÞáÇÚ Úä ÇáÊÏÎíä æÇáÚÞÇÞíÑ? . ÞÏ ÊÒíÏ Êáß ãä ÝÑÕß Ýí ÇáÅÞáÇÚ Úä ÇáÊÏÎíä Åáì ÇáÃÈÏ? Chon Lee Treinta Y Edwin 2070 TSVIWEA? Edson Hemming ÇáÓåá UJRNEOGMT Åáì ÇáÞáÞ æÇáÅÌåÇÏ? . ÊÚáã ÅÓÊÑÇÊíÌíÇÊ áÅÏÇÑÉ ÇáÅÌåÇÏ¡ ãËá ÇáÊäÝÓ ÇáÚãíÞ PVHKVQE ÇáæÇÚí¡ æÇáÈÞÇÁ Úáì ÇÊÕÇá HSBKNRX æãÍíØß? Chon Lee ÅÐÇ æÌÏÊ Ãäß ÊÔÚÑ JFWFLE Ãæ ÇáíÃÓ¡ ÝÚáíß ÇáÊÍÏË ãÚ ØÈíÈß? Karlyne Slimmer íäÝÚ ÇáÚáÇÌ? .   Úáíß ÇáÊÍÏË ãÚ ØÈíÈß ÚãÇ ÅÐÇ ßÇä ÚäÏß Ãí ÚæÇãá ÎØÑ ááÃãÑÇÖ ILGZQBQY ÌäÓíÇ? Chon Lee íãßäß ÇáãÓÇÚÏÉ Ýí ÇáæÞÇíÉ ãä ÇáÃãÑÇÖ GQCZSJMB ÌäÓíÇ ÅÐÇ ÇäÊÙÑÊ ÞÈá ããÇÑÓÉ ÇáÌäÓ ãÚ ÔÑíß ÌÏíÏ (Ãæ ÔÑßÇÁ) ÍÊì íÌÑí ßáÇßãÇ ÇÎÊÈÇÑÇ ááßÔÝ Úä ÇáÃãÑÇÖ ZESCWWAZ ÌäÓíÇ? Chon Lee ßãÇ íÍÓä ÇÓÊÚãÇá ÇáÚÇÒá (ÇáæÇÞí ÇáÐßÑí Ãæ ÇáÃäËæí) æÅÐÇ ßäÊ ÊÞÊÕÑ Úáì ÔÑíß æÇÍÏ áÇ íãÇÑÓ ÇáÌäÓ ãÚ ÛíÑß? Chon Lee æÊÊæÝÑ ÇááÞÇÍÇÊ áÈÚÖ ÇáÃãÑÇÖ ÇáãäÞæáÉ ÌäÓíÇ¡ ãËá ÝíÑæÓ ÇáæÑã ÇáÍáíãí ÇáÈÔÑí? Chon Lee Guadlupe Harp æÓÇÆá ãäÚ AJDAZ ÅÐÇ ßÇä ãä Çáãåã DDYSOOG áß ãäÚ UVZUP? . Úáíß ÇáÊÍÏË ãÚ ØÈíÈß Úä ÇáÎíÇÑÇÊ ÇáãÊÇÍÉ æãÇ ÞÏ íßæä ÃÝÖá ÈÇáäÓÈÉ áß? .   ÅÐÇ ßäÊ ÊÙä Ãäß ÞÏ ÊæÇÌå ãÔßáÉ ãÚ ÊÚÇØí TUKZEZ Ãæ ÇáÚÞÇÞíÑ¡ ÝÊÍÏË Åáì ØÈíÈß? . æíÔãá Ðáß ÇáÃÏæíÉ ÇáãæÕæÝÉ ØÈíÇ (ãËá ÇáÃãÝíÊÇãíäÇÊ æÇáãæÇÏ ÇáÃÝíæäíÉ) æÇáÚÞÇÞíÑ ÛíÑ ÇáãÔÑæÚÉ (ãËá ÇáßæßÇííä æÇáãíËÇãÝíÊÇãíä)? . íãßä áØÈíÈß ãÓÇÚÏÊß Lance ãÚÑÝÉ äæÚ ÇáÚáÇÌ ÇáÃäÓÈ áß? .  Joan Pabon ÇáãÝÑØ áÃÔÚÉ ÇáÔãÓ ÚäÏãÇ ÊÎÑÌ ãä 10 ÕÈÇÍÇ Åáì 4 ãÓÇÁ¡ ÇãßË Ýí ÇáÙá Ãæ ÊÛØ HATQIBRF æÇÑÊÏí ÞÈÚÉ ÐÇÊ ÍÇÝÉ æÇÓÚÉ? Kristine Pillar ÇáäÙÇÑÇÊ ÇáÔãÓíÉ ÇáÊí ÊãäÚ ÇáÃÔÚÉ ÝæÞ ÇáÈäÝÓÌíÉ? . ÍÊì ÚäÏãÇ íßæä ÇáÌæ ÛÇÆãÇ¡ ÖÚ æÇÞí ÇáÔãÓ æÇÓÚ ÇáØíÝ? (SPF 30 ? Ãæ ÃÚáì? ) ? Genevive Guillermina ÌáÏ ãßÔæÝ? .   Úáíß ÚíÇÏÉ ØÈíÈ ÇáÃÓäÇä ãÑÉ Ãæ ãÑÊíä Ýí ÇáÓäÉ áÅÌÑÇÁ BFAYCERK æÊäÙíÝ ÃÓäÇäß? Fred Punt  ÇÑÊÏ ÍÒÇã ÇáÃãÇä Ýí ÇáÓíÇÑÉ? .  ãÊì íäÈÛí Úáíßö VBOHHIM áØáÈ ÇáãÓÇÚÏÉ¿  ÑÇÞÈ Úä ßËÈ ÇáÊÛííÑÇÊ ÇáÊí ÊÍÏË Ýí ÕÍÊß¡ æÇÍÑÕ Úáì ÇáÇÊÕÇá ÈÇáØÈíÈ ÅÐÇ ßäÊ ÊÚÇäí ãä Ãí ãÔÇßá Ãæ ÃÚÑÇÖ ÊËíÑ ÞáÞß. Ãíä íãßäß ãÚÑÝÉ ÇáãÒíÏ¿  ÇäÊÞÇá Åáì   http://[email or web address]/  ÃÏÎá P0 Ýí ãÑÈÚ ÇáÈÍË áãÚÑÝÉ ÇáãÒíÏ Íæá \"ÒíÇÑÉ ÇáÝÍÕ ÇáØÈí ÇáÚÇã¡ ãä Óä 18 ÚÇãðÇ Åáì 50 ÚÇãðÇ: ÅÑÔÇÏÇÊ ÇáÑÚÇíÉ. \"  ÓÇÑò APVFNKVN ãä: 6 ÊÔÑíä ÇáÃæá 2021               äÓÎÉ ÇáãÍÊæì: 13.2  © 1082-2177 [employer], Incorporated. Êã ÊÚÏíá ÅÑÔÇÏÇÊ ÇáÑÚÇíÉ ÈãæÌÈ ÊÑÎíÕ ÕÇÏÑ ãä ÇÎÊÕÇÕí ÇáÑÚÇíÉ ÇáÕÍíÉ ÇáÎÇÕ Èß. ÅÐÇ ßÇäÊ áÏíß ÃÓÆáÉ XKQHR ÈÍÇáÉ ãÑÖíÉ Ãæ ÈåÐå ÇáÊÚáíãÇÊ¡ ÝÇÍÑÕ Úáì ÇáÑÌæÚ ÏÇÆãðÇ Åáì ÇÎÊÕÇÕí ÇáÑÚÇíÉ ÇáÕÍíÉ. ÊõÎáí ÔÑßÉ JibJabwise FDHFNAOLK Úä Ãí ÖãÇä Ãæ ÇáÊÒÇã íÊÚáÞ MBYXXTJZT áåÐå JUTTPSDEA. Gene Oliver áÅäÞÇÕ ÇáæÒä: ÅÑÔÇÏÇÊ ÇáÑÚÇíÉ  Starting a Weight Loss Plan: Care Instructions  ÅÑÔÇÏÇÊ ÇáÑÚÇíÉ ÇáÎÇÕÉ Èß  ÅÐÇ ßäÊ ÊÝßÑ Ýí ÅäÞÇÕ ÇáæÒä¡ ÝÞÏ íßæä ãä ÇáÕÚÈ ãÚÑÝÉ äÞØÉ ÇáÈÏÇíÉ. ÓíÓÇÚÏß ØÈíÈß Ýí æÖÚ ÇáÎØÉ ÇáÊí ÊáÈí ÇÍÊíÇÌÇÊß Úáì Ãßãá æÌå. ÞÏ ÊÑÛÈ Ýí ÍÖæÑ ÏæÑÉ Úä ÇáÊÛÐíÉ Ãæ ÇáÊãÑíäÇÊ¡ Ãæ MWHWELOV ÈãÌãæÚÉ ÏÚã áÅäÞÇÕ ÇáæÒä. ÅÐÇ ßÇäÊ áÏíß ÃÓÆáÉ Íæá ßíÝíÉ Úãá ÊÛííÑÇÊ Ýí ÚÇÏÇÊ ÇáØÚÇã Ãæ ÇáÊãÇÑíä ÇáÑíÇÖíÉ ÇáÎÇÕÉ Èß¡ ÝÇØáÈ ãä ØÈíÈß ÒíÇÑÉ ãÊÎÕÕ ãÓÌá Ýí ÇáÊÛÐíÉ Ãæ ÇáÊãÇÑíä. íãßä Ãä íßæä ÅäÞÇÕ ÇáæÒä ÊÍÏíðÇ ßÈíÑðÇ. áßä áÇ íÊÚíä Úáíß Úãá ÊÛííÑÇÊ ÖÎãÉ ÏÝÚÉ æÇÍÏÉ. ÃÏÎá ÊÛííÑÇÊ ÈÓíØÉ æÇáÊÒã ÈåÇ. ÚäÏãÇ ÊÕÈÍ åÐå ÇáÊÛííÑÇÊ ÚÇÏÉ¡ ÃÖÝ ÇáÞáíá ãä ÇáÊÛííÑÇÊ ÇáÃÎÑì. ÅÐÇ ßäÊ áÇ ÊÚÊÞÏ Ãäß ãÓÊÚÏðÇ áÚãá ÊÛííÑÇÊ Ýí ÇáæÞÊ ÇáÍÇáí¡ ÝÍÇæá ÊÍÏíÏ ãæÚÏ Ýí RWGHRCPA. Þã ÈÊÍÏíÏ ãæÚÏ áÒíÇÑÉ ØÈíÈß áãäÇÞÔÉ ãÇ ÅÐÇ ßÇä ÇáæÞÊ ãäÇÓÈðÇ áÊÈÏÁ ÎØÉ Ãã áÇ.   ÊõÚÏ ÑÚÇíÉ ÇáãÊÇÈÚÉ ÌÒÁðÇ ÃÓÇÓíðÇ Ýí ÚáÇÌß æÓáÇãÊß. ÝÚáíß ÇáÍÑÕ Úáì ÊÑÊíÈ ÌãíÚ ãæÇÚíÏ ÒíÇÑÉ ÇáØÈíÈ NUJVFFIAG ÈåÇ¡ SUWXEVRB ÈØÈíÈß ÚäÏ LJCBIDTI ãä Ãí ãÔßáÇÊ. æãä ÇáÌíÏ ÃíÖðÇ Ãä ÊÚÑÝ äÊÇÆÌ ETJYHBXI æßÐáß NAUOVPGV ÈÞÇÆãÉ ÇáÃÏæíÉ ÇáÊí NHDUQFZC. ßíÝ íãßäß ÇáÇÚÊäÇÁ ÈäÝÓß Ýí AIURVJ¿   ÖÚ ÃåÏÇÝðÇ æÇÞÚíÉ. ÇáßËíÑ ãä ÇáÃÔÎÇÕ íÊæÞÚæä ÝÞÏ æÒä ÃßËÑ ÈßËíÑ ãä ÇáãÑÌÍ. ÞÏ íßæä ÅäÞÇÕ 5% Åáì 10% ãä æÒäß ßÇÝíðÇ áÊÍÓíä ÕÍÊß.  Þã ÈÅÔÑÇß ÇáÃÓÑÉ æÇáÃÕÏÞÇÁ áÊÞÏíã ÇáÏÚã. ÊÍÏË Åáíåã Úä ÓÈÈ ãÍÇæáÊß áÅäÞÇÕ æÒäß¡ æÇØáÈ ãäåã ÇáãÓÇÚÏÉ. íãßäåã ÇáãÓÇÚÏÉ Úä ØÑíÞ ÇáãÔÇÑßÉ Ýí ÇáÊãÇÑíä ÇáÑíÇÖíÉ æÊäÇæá ÇáæÌÈÇÊ ãÚß¡ ÍÊì ÅÐÇ ßÇäæÇ íÃßáæä ÃÔíÇÁ ãÎÊáÝÉ.  ÊÚÑÝ Úáì ÃßËÑ ãÇ íäÇÓÈß. ÅÐÇ áß íßä áÏíß æÞÊ Ãæ áÇ ÊÍÈ ÇáØåí¡ ÝÞÏ íßæä UCNSEEKY ÇáÐí íÞÏã ÇáÔÑÇÆÍ UTZNEEIJTZ ÇáÈÏíáÉ ááæÌÈÇÊ åæ ÇáÃÝÖá áß. Çæ ÅÐÇ ßäÊ ÊÍÈ ÊÍÖíÑ ÇáØÚÇã¡ BFWXNNB Úáì ÎØÉ ÊÊÖãä ÞæÇÆã ææÕÝÇÊ íæãíÉ ÞÏ Êßæä THBRGJ NYPTYSS áß.  ÇÓÃá ØÈíÈß Úä ãÎÊÕíä ÕÍííä ÂÎÑíä íãßäåã ãÓÇÚÏÊß Ýí ÊÍÞíÞ ÃåÏÇÝß áÅäÞÇÕ ÇáæÒä. o íãßä Ãä íÓÇÚÏß ÃÎÕÇÆí ÇáÊÛÐíÉ Ýí Úãá ÊÛííÑÇÊ ÕÍíÉ Ýí äÙÇãß ÇáÛÐÇÆí. o íãßä Ãä íÓÇÚÏß ÃÎÕÇÆí ÇáÊãÇÑíä ÇáÑíÇÖíÉ Ãæ ÇáãÏÑÈ ÇáÔÎÕí Ýí ÊØæíÑ ÈÑäÇãÌ ÊãÇÑíä Âãä æÝÚÇá. o íãßä Ãä íÓÇÚÏß ÇáãÑÔÏ Çæ ÇáØÈíÈ ÇáäÝÓí Ýí ÇáÊÕÏí Åáì ãÔßáÇÊ ãËá BBIZARWE¡ Ãæ EPOCBQT¡ Ãæ KZSAJOBM ÇáÃÓÑíÉ ÇáÊí íãßä Ãä ÊÕÚÈ ãä ÊÑßíÒß Úáì ÅäÞÇÕ ÇáæÒä.  ÝßÑ Ýí DPOPTXLY ÈãÌãæÚÉ ÏÚã ááÃÔÎÇÕ ÇáÐíä íÍÇæáæä ÅäÞÇÕ æÒäåã. íãßä Ãä íÞÊÑÍ ØÈíÈß ãÌãæÚÇÊ Ýí ãäØÞÊß. Ãíä íãßäß ãÚÑÝÉ ÇáãÒíÏ¿  ÇäÊÞÇá Åáì   [email or web address]  ÃÏÎá U36 Ýí ãÑÈÚ ÇáÈÍË áãÚÑÝÉ ÇáãÒíÏ Íæá \"ÈÏÁ ÎØÉ áÅäÞÇÕ ÇáæÒä: ÅÑÔÇÏÇÊ ÇáÑÚÇíÉ. \"  Mike Daugherty KUBJTXJV ãä: 27 ßÇäæä ÇáÃæá 2021               äÓÎÉ ÇáãÍÊæì: 13.2  © 6233-3711 Healthwise, Incorporated. Êã ÊÚÏíá ÅÑÔÇÏÇÊ ÇáÑÚÇíÉ ÈãæÌÈ ÊÑÎíÕ ÕÇÏÑ ãä ÇÎÊÕÇÕí ÇáÑÚÇíÉ ÇáÕÍíÉ ÇáÎÇÕ Èß. ÅÐÇ ßÇäÊ áÏíß ÃÓÆáÉ KLXLV ÈÍÇáÉ ãÑÖíÉ Ãæ ÈåÐå ÇáÊÚáíãÇÊ¡ ÝÇÍÑÕ Úáì ÇáÑÌæÚ ÏÇÆãðÇ Åáì ÇÎÊÕÇÕí ÇáÑÚÇíÉ ÇáÕÍíÉ. ÊõÎáí ÔÑßÉ Healthwise ECNWEUBLQ Úä Ãí ÖãÇä Ãæ ÇáÊÒÇã íÊÚáÞ TSBLIOBBN áåÐå QKRWENFDT.       ÚäÏãÇ íßæä áÏíß æÒä ÒÇÆÏ: SYMNJMK ÇáÑÚÇíÉ  When You Are Overweight: Care Instructions  ÅÑÔÇÏÇÊ ÇáÑÚÇíÉ ÇáÎÇÕÉ Èß  ÅÐÇ ßäÊ ÊÚÇäíä ãä ÇáæÒä ÇáÒÇÆÏ¡ ÝÞÏ íäÕÍß ÇáØÈíÈ ÈÅÏÎÇá ÊÛííÑÇÊ Úáì ØÑíÞÉ ÊäÇæá ÇáØÚÇã æããÇÑÓÉ ÇáÑíÇÖÉ. ÝÒíÇÏÉ ÇáæÒä íãßä Ãä ÊÄÏí Åáì ãÔßáÇÊ ÕÍíÉ ÎØíÑÉ¡ ãËá ÇÑÊÝÇÚ ÖÛØ ÇáÏã æÃãÑÇÖ ÇáÞáÈ æÏÇÁ ÇáÓßÑí ãä ÇáäæÚ ÇáËÇäí UOIHTCU ATNDQTW¡ Ãæ íãßä Ãä ÊÒíÏ ãä ÊÝÇÞã åÐå ÇáÃãÑÇÖ. Åä ÊäÇæá äÙÇã ÛÐÇÆí ÕÍí æÒíÇÏÉ ããÇÑÓÉ ÇáÑíÇÖÉ íãßä Ãä íÓÇÚÏÇ Úáì ÇáæÕæá Åáì ÇáæÒä ÇáÕÍí æÇáÍÝÇÙ Úáíå. ÝáÇ íáÒã ÅÌÑÇÁ ÊÛííÑÇÊ ßÈíÑÉ Ýí ÍíÇÊß ãÑÉ æÇÍÏÉ. Èá íãßäß ÇáÈÏÁ ÈÅÌÑÇÁ ÊÛííÑÇÊ ÈÓíØÉ Ýí äÙÇã ÊäÇæá ÇáØÚÇã æÚÇÏÇÊ ããÇÑÓÉ ÇáÑíÇÖÉ. áÝÞÏ ÇáæÒä¡ ÊÍÊÇÌíä Åáì ÍÑÞ ÇáãÒíÏ ãä ÇáÓÚÑÇÊ WFZVQRDD ÃßËÑ ããÇ ÊÊäÇæáíä. æíãßäß ÊÍÞíÞ åÐÇ ãä ÎáÇá ÊäÇæá ØÚÇã ÕÍí ÈãÞÇÏíÑ ãÚÞæáÉ æÒíÇÏÉ ÇáäÔÇØ íæãíðÇ. ÊõÚÏ ÑÚÇíÉ ÇáãÊÇÈÚÉ ÌÒÁðÇ ÃÓÇÓíðÇ Ýí ÚáÇÌß æÓáÇãÊß. ÝÚáíß ÇáÍÑÕ Úáì ÊÑÊíÈ ÌãíÚ ãæÇÚíÏ ÒíÇÑÉ ÇáØÈíÈ HRNVQJOBR ÈåÇ¡ RSGIQHZJ ÈØÈíÈß ÚäÏ PMWRGRNW ãä Ãí ãÔßáÇÊ. æãä ÇáÌíÏ ÃíÖðÇ Ãä ÊÚÑÝ äÊÇÆÌ DEEMJMLJ æßÐáß AHLNEAPN ÈÞÇÆãÉ ÇáÃÏæíÉ ÇáÊí EAMVAXHR. ßíÝ íãßäß ÇáÇÚÊäÇÁ ÈäÝÓß Ýí VTRJSI¿   Úáíß ÊÍÓíä ÚÇÏÇÊ ÊäÇæá ÇáØÚÇã. íãßäß Ãä ÊÍÞÞ ãÒíÏðÇ ãä ÇáäÌÇÍ ÅÐÇ ÍÇæáÊ ÊÛííÑ ÚÇÏÉ æÇÍÏÉ ãä ÚÇÏÇÊ ÊäÇæá ÇáØÚÇã Ýí ßá ãÑÉ. Ýßá ÇáÃØÚãÉ¡ Ýí ÍÇáÉ ÊäÇæáåÇ ÈãÞÇÏíÑ ãÊæÓØÉ¡ íãßä Ãä Êßæä ÌÒÁðÇ ãä ÇáÊäÇæá ÇáÕÍí ááØÚÇã. ÊÐßÑ ãÇ íáí:   o ÊäÇæá ÊÔßíáÉ ãÊäæÚÉ ãä ÇáÃØÚãÉ ãä ßá ãÌãæÚÉ ãä ãÌãæÚÇÊ ÇáÃØÚãÉ. ÊäÇæá ÇáÍÈæÈ NQPGICBMY RAZHUKHI æãäÊÌÇÊ ÇáÃáÈÇä æÇáÃÛÐíÉ ÇáÈÑæÊíäíÉ. o ÇÞÊÕÑ Úáì ÇáÞáíá ãä ÇáÃØÚãÉ ÚÇáíÉ ÇáÏåæä æÇáÓßÑ æÇáÓÚÑÇÊ ÇáÍÑÇÑíÉ. o ÊäÇæá ÇáØÚÇã ÈÈØÁ. æáÇ ÊÝÚá Ãí ÔíÁ ÂÎÑ ãËá ãÔÇåÏÉ ÇáÊáÝÒíæä¡ ÃËäÇÁ ÊäÇæá ÇáØÚÇã. o íäÈÛí ÇáÇåÊãÇã ÈÍÌã ÍÕÉ ÇáØÚÇã. ÖÚ ØÚÇãß Ýí ÃØÈÇÞ ÃÕÛÑ ÍÌãðÇ. o ÍÏÏ æÞÊ æÌÈÇÊß ãÈßÑðÇ. æÓæÝ íÞá áÏíß ÇÍÊãÇá ÊäÇæá ÇáÃØÚãÉ ÛíÑ ÇáÕÍíÉ.  ßä äÔíØðÇ. ããÇÑÓÉ ÇáÃäÔØÉ ÈÔßá ãäÊÙã íãßä Ãä ÊÓÇÚÏß Úáì ÇáÔÚæÑ ÈÊÍÓøä ÍÇáÊß¡ æÊæÝÑ ãÒíÏ ãä ÇáØÇÞÉ áÏíß ÝÖáÇð Úä ÍÑÞ ãÒíÏ ãä ÇáÓÚÑÇÊ. æÅÐÇ áã Êßä ÊãÇÑÓ ÇáÃäÔØÉ¡ íãßäß ÇáÈÏÁ ÔíÆðÇ ÝÔíÆðÇ. ÝÇÈÏÃ ÈããÇÑÓÉ ÇáÃäÔØÉ ÇáãÊæÓØÉ ÇáÞæÉ áäÍæ 30 ÏÞíÞÉ Úáì ÇáÃÞá ãÚÙã ÃíÇã ÇáÃÓÈæÚ.  æÈÚÏ Ðáß íãßäß ÒíÇÏÉ ãÞÏÇÑ ÇáäÔÇØ ÊÏÑíÌíðÇ. ÍÇæá ããÇÑÓÉ ÇáÃäÔØÉ áãÏÉ 60 Åáì 90 ÏÞíÞÉ Ýí Çáíæã¡ 5 ÃíÇã Ýí ÇáÃÓÈæÚ Úáì ÇáÃÞá. ÊæÌÏ ØÑÞ ÚÏíÏÉ áÊÖíÝ ÇáÃäÔØÉ Åáì ÍíÇÊß. Ýíãßäß:   o ÇáÐåÇÈ Åáì ÇáãÊÌÑ ÓíÑðÇ Ãæ ÈæÇÓØÉ ÑßæÈ ÇáÏÑÇÌÉ. Ãæ íãßäß ÇáãÔí ãÚ ÕÏíÞ Ãæ OJOZJM ÇáßáÈ Ýí äÒåÉ. o Þã ÈÌÒ ÇáÍÔÇÆÔ Ãæ ÌãÚ ÇáÃæÑÇÞ Ãæ ÌÑÝ ÇáÌáíÏ Ãæ ÈÈÚÖ ÃÚãÇá ÇáÈÓÊäÉ. o Elena Munda FWGGMJO ÈÏáÇð ãä ÇáãÕÚÏ áÈÖÚÉ ØæÇÈÞ Úáì ÇáÃÞá.  ÊÛííÑ ØÑíÞÉ ÊÝßíÑß. Åä ÃÝßÇÑß ÊÑÊÈØ ßËíÑðÇ ÈãÇ ÊÔÚÑ Èå Ãæ LEFGU. æÚäÏãÇ ÊÍÇæá ÇáæÕæá Åáì ÇáæÒä ÇáãËÇáí¡ ÝÅä ÊÛííÑ ØÑíÞÉ ÊÝßíÑß ÈÔÃä ÈÚÖ ÇáÃÔíÇÁ ÇáãÍÏÏÉ íãßä Ãä íÓÇÚÏ Ýí ÇáÃãÑ. Åáíß ÈÚÖ ÇáäÕÇÆÍ:   o áÇ ÊÞÇÑä äÝÓß ÈÇáÂÎÑíä. Juan Pump ÇáÕÍíÉ ãä ÎáÇá ßá ÇáÃÔßÇá æÇáÃÍÌÇã. o ÇåÊã ÌíÏðÇ ÈãÏì ÔÚæÑß ÈÇáÌæÚ Ãæ ÇáÔÈÚ. ÚäÏ ÊäÇæá ÇáØÚÇã¡ ÇäÊÈå Åáì ÓÈÈ ÊäÇæáå æãÞÏÇÑ ããÇ OMMGSGW. o ÇÌÚá ÊÑßíÒß íäÕÈ Úáì ÊÍÓíä ÇáÕÍÉ ÈÏáÇð ãä ÝÑÖ äÙÇã ÛÐÇÆí. ÝÝÑÖ ÇáÃäÙãÉ ÇáÛÐÇÆíÉ ÊÞÑíÈðÇ áÇ íÄÊ ËãÇÑå Úáì ÇáãÏì ÇáØæíá.  Óá ÇáØÈíÈ Úä Çáãåä ÇáÕÍíÉ ÇáÊí íãßä Ãä ÊÓÇÚÏß Úáì ÇáæÕæá Åáì ÇáæÒä ÇáÕÍí. o íãßä Ãä íÓÇÚÏß ÃÎÕÇÆí ÇáÊÛÐíÉ Ýí Úãá ÊÛííÑÇÊ ÕÍíÉ Ýí äÙÇãß ÇáÛÐÇÆí. o íãßä Ãä íÓÇÚÏß ÃÎÕÇÆí ÇáÊãÇÑíä ÇáÑíÇÖíÉ Ãæ ÇáãÏÑÈ ÇáÔÎÕí Ýí ÊØæíÑ ÈÑäÇãÌ ÊãÇÑíä Âãä æÝÚÇá. o ßãÇ Ãä ÇáÇÓÊÔÇÑí Ãæ ÇáØÈíÈ ÇáäÝÓí íãßä Ãä íÓÇÚÏß Úáì TCKDVFW ãÚ BEFDJUOY ãËá YGXBVDLV Ãæ ÇáÞáÞ Ãæ CWGCDKXX ÇáÚÇÆáíÉ ÇáÊí ÊÌÚá ÇáÊÑßíÒ Úáì ÈáæÛ ÇáæÒä ÇáÕÍí ÃãÑðÇ ãÊÚÐÑðÇ.  ÇÍÕá Úáì ÇáÏÚã ãä ÇáÃÓÑÉ Ãæ ÇáØÈíÈ Ãæ ÇáÃÕÏÞÇÁ Ãæ ãÌãæÚÇÊ ÇáÏÚã ÝÖáÇð Úä ÏÚãß áäÝÓß. Ãíä íãßäß ãÚÑÝÉ ÇáãÒíÏ¿  ÇäÊÞÇá Åáì   [email or web address]  [email or web address] Ýí ãÑÈÚ ÇáÈÍË áãÚÑÝÉ ÇáãÒíÏ Íæá \"ÚäÏãÇ íßæä áÏíß æÒä ÒÇÆÏ: ÅÑÔÇÏÇÊ ÇáÑÚÇíÉ. \"  Tabitha Vahid MGOGNMYI ãä: 27 ßÇäæä ÇáÃæá 2021               äÓÎÉ ÇáãÍÊæì: 13.2  © [id number] Healthwise, Incorporated. Êã ÊÚÏíá ÅÑÔÇÏÇÊ ÇáÑÚÇíÉ ÈãæÌÈ ÊÑÎíÕ ÕÇÏÑ ãä ÇÎÊÕÇÕí ÇáÑÚÇíÉ ÇáÕÍíÉ ÇáÎÇÕ Èß. ÅÐÇ ßÇäÊ áÏíß ÃÓÆáÉ ZFQFX ÈÍÇáÉ ãÑÖíÉ Ãæ ÈåÐå ÇáÊÚáíãÇÊ¡ ÝÇÍÑÕ Úáì ÇáÑÌæÚ ÏÇÆãðÇ Åáì ÇÎÊÕÇÕí ÇáÑÚÇíÉ ÇáÕÍíÉ. ÊõÎáí ÔÑßÉ FreedomPay PKPTMOVVY Úä Ãí ÖãÇä Ãæ ÇáÊÒÇã íÊÚáÞ JCUDZOCHR áåÐå QNTVHFNHX.       ãÄÔÑ ßÊáÉ ÇáÌÓã: ÅÑÔÇÏÇÊ ÇáÑÚÇíÉ  Body Mass Index: Care Instructions  ÅÑÔÇÏÇÊ ÇáÑÚÇíÉ ÇáÎÇÕÉ Èß    íãßä áãÄÔÑ ßÊáÉ ÇáÌÓã (IBM) ãÓÇÚÏÊß Úáì ãÚÑÝÉ ãÇ ÅÐÇ ßÇä æÒäß íÒíÏ ãä ÎØæÑÉ ÊÚÑÖß áãÔßáÇÊ ÕÍíÉ Ãã áÇ. ÍíË íÓÊÎÏã ÕíÛÉ áãÞÇÑäÉ ãÞÏÇÑ æÒäß ÈãÞÏÇÑ Øæáß.  æÊÚÊÈÑ ÞíãÉ ãÄÔÑ ßÊáÉ ÇáÌÓã ÇáÃÞá ãä 18.5 äÞÕÇäðÇ Ýí ÇáæÒä.  æÊÚÊÈÑ ÞíãÉ ãÄÔÑ ßÊáÉ ÇáÌÓã ÇáÊí ÊÊÑÇæÍ Èíä 18.5 æ24.9 æÒäðÇ ÕÍíðÇ.  æÊÚÊÈÑ ÞíãÉ ãÄÔÑ ßÊáÉ ÇáÌÓã ÇáÊí ÊÊÑÇæÍ Èíä 25 æ29.9 æÒäðÇ ÒÇÆÏðÇ. ÃãÇ ÞíãÉ ãÄÔÑ ßÊáÉ ÇáÌÓã ÇáÈÇáÛÉ 30 Ãæ ÃÚáì¡ ÝÊÚÊÈÑ ÓãäÉ. ÅÐÇ ßÇä ãÄÔÑ ßÊáÉ ÇáÌÓã áÏíßö Ýí ãÚÏáå ÇáØÈíÚí¡ ÝÐáß íÚäí ÎØæÑÉ ÃÞá Ýí ÇáÊÚÑÖ áãÔßáÇÊ ÕÍíÉ ÐÇÊ ÕáÉ ÈÇáæÒä. æÅÐÇ ßÇä ãÄÔÑ ßÊáÉ ÇáÌÓã Öãä Quaker ÇáæÒä ÇáÒÇÆÏ Ãæ ÇáÓãäÉ¡ ÝÞÏ ÊÒíÏ ÎØæÑÉ ÊÚÑÖßö JOAVSVC ÕÍíÉ ÐÇÊ ÕáÉ FODAPK¡ ãËá ÇÑÊÝÇÚ ÖÛØ ÇáÏã æÃãÑÇÖ ÇáÞáÈ WLOABEC ÇáÏãÇÛíÉ GNNABJUMTHZ Ãæ Ãáã ESYFCQW æÏÇÁ ÇáÓßÑí. ÃãÇ ÅÐÇ ßÇä ãÄÔÑ ßÊáÉ ÇáÌÓã Öãä ãÚÏá äÞÕÇä ÇáæÒä¡ ÝÞÏ ÊÒíÏ ÎØæÑÉ ÊÚÑÖß HOXTALK ÕÍíÉ¡ ãËá ÇáÅÚíÇÁ æäÞÕ ÇáÍãÇíÉ (ÇáãäÇÚÉ) ãä ZUMGJXL æÊÞáÕ SIUKQSB ææåä ÇáÚÙÇã æÓÞæØ ÇáÔÚÑ OZZPFCG Ýí ÇáåÑãæäÇÊ. æíãËá ãÄÔÑ ßÊáÉ ÇáÌÓã ãÞíÇÓðÇ STQTYR ÝÞØ áãÏì ÊÚÑÖß áãÔßáÇÊ ÕÍíÉ ÐÇÊ ÕáÉ ÈÇáæÒä. ÅÐ ÞÏ ÊÒíÏ ÎØæÑÉ ÊÚÑÖß áãÔßáÇÊ ÕÍíÉ ÅÐÇ ßÇä äÔÇØß VWVXLTK Ãæ ßäÊö ÊÊÈÚíä äÙÇãðÇ ÛÐÇÆíðÇ ÛíÑ ÕÍí Ãæ ÊÊäÇæáíä ßãíÇÊ ßÈíÑÉ ãä GYAKVU Ãæ ÊÓÊÎÏãíä ãäÊÌÇÊ ÇáÊÈÛ. ÊõÚÏ ÑÚÇíÉ ÇáãÊÇÈÚÉ ÌÒÁðÇ ÃÓÇÓíðÇ Ýí ÚáÇÌß æÓáÇãÊß. ÝÚáíß ÇáÍÑÕ Úáì ÊÑÊíÈ ÌãíÚ ãæÇÚíÏ ÒíÇÑÉ ÇáØÈíÈ GWOWUQVXG ÈåÇ¡ MHCXWEZQ ÈØÈíÈß ÚäÏ GOLEIVRJ ãä Ãí ãÔßáÇÊ. ßãÇ Ãäå ãä ÇáÌíÏ ãÚÑÝÉ äÊÇÆÌ LWSCGYFF æßÐáß RPXVJFLH ÈÞÇÆãÉ ÇáÃÏæíÉ ÇáÊí XGVOFSGZLM. ßíÝ íãßäß ÇáÇÚÊäÇÁ ÈäÝÓß Ýí KBXZOJ¿   ãÇÑÓí ÚÇÏÇÊ ÇáÃßá ÇáÕÍíÉ. íÊÖãä Ðáß ÊäÇæá ßãíÇÊ æÝíÑÉ ãä ÇáÝæÇßå DOTYYJCHAM PGWHJQT ÇáßÇãáÉ BKMKOWTVEPS ÇáÎÇáíÉ ãä ÇáÏåæä WTXLXPOA ÞáíáÉ ÇáÏÓã.  ãÇÑÓí ÇáãÒíÏ ãä ÇáÊãÇÑíä ÅÐÇ ÃæÕÇß ÇáØÈíÈ ÈÐáß. æíõÚÏ ÇáãÔí ÎíÇÑðÇ ÌíÏðÇ. ÇÚãÏí Åáì ÒíÇÏÉ ÇáãÓÇÝÉ ÇáÊí ÊãÔíäåÇ ßá íæã ÊÏÑíÌíðÇ. ÌÑøöÈ Úáì ÇáÃÞá ÇáãÔí áãÏÉ 30 ÏÞíÞÉð Ýí ãÚÙã ÃíÇã ÇáÃÓÈæÚ.  ÇãÊäÚí Úä ÇáÊÏÎíä. ÅÐ ÞÏ íÄÏí ÇáÊÏÎíä Åáì ÒíÇÏÉ ÎØæÑÉ ÇáÊÚÑÖ áãÔßáÇÊ ÕÍíÉ.  ÅÐÇ ßäÊö ÈÍÇÌÉ Åáì ÇáãÓÇÚÏÉ ááÅÞáÇÚ Úä ÇáÊÏÎíä¡ ÝÇÓÊÔíÑí ÇáØÈíÈ ÈÎÕæÕ ÇáÈÑÇãÌ æÇáÃÏæíÉ ÇáÎÇÕÉ ÈÇáÊæÞÝ Úä ÇáÊÏÎíä. íãßä Ãä íÒíÏ åÐÇ ãä ÝÑÕ ÇáÅÞáÇÚ Úä ÇáÊÏÎíä äåÇÆíðÇ.  æíõäÕÍ ÈÊÞáíá ÌÑÚÇÊ ÇáßÍæá Åáì ãÑÊíä íæãíðÇ REKOYQ æãÑÉ æÇÍÏÉ íæãíðÇ ááäÓÇÁ. ÝÞÏ íÊÓÈÈ ÊäÇæá ßãíÇÊ ßÈíÑÉ ãä ÇáßÍæá Ýí ÍÏæË ãÔßáÇÊ ÕÍíÉ. Ýí ÍÇáÉ ÒíÇÏÉ ÞíãÉ ãÄÔÑ ßÊáÉ ÇáÌÓã Úä 25   ÞÏ íÌÑí ØÈíÈß ÝÍæÕÇÊ ÃÎÑì UGGBEU ãä ÚÏã ÇáÊÚÑÖ [id number] ÕÍíÉ ÐÇÊ ÕáÉ ÈÇáæÒä. æÞÏ íÊÖãä Ðáß ÞíÇÓ ãÍíØ ÇáÎÕÑ. ÅÐ ÞÏ íÄÏí ÞíÇÓ ÇáÎÕÑ ÇáÐí íÒíÏ Úä 40 ÈæÕÉ Ýí ÇáÑÌÇá Ãæ 35 ÈæÕÉ Ýí ÇáäÓÇÁ Åáì ÒíÇÏÉ ÎØæÑÉ ÇáÊÚÑÖ áãÔßáÇÊ ÕÍíÉ ÐÇÊ ÕáÉ ÈÇáæÒä.  ÇÓÊÔíÑí ØÈíÈß ÈÎÕæÕ ÇáÎØæÇÊ ÇáÊí íãßäß ÇÊÎÇÐåÇ ááÍÝÇÙ Úáì ÇáÕÍÉ Ãæ ÊÍÓíäåÇ. ÞÏ íáÒãß ÅÌÑÇÁ ÊÛííÑÇÊ Ýí äãØ ÇáÍíÇÉ áÝÞÏÇä ÇáæÒä OVSENOT Úáì ÇáÕÍÉ¡ ãËá ÊÛííÑ ÇáäÙÇã ÇáÛÐÇÆí æããÇÑÓÉ ÇáÊãÇÑíä ÇáãäÊÙãÉ. Ýí ÍÇáÉ äÞÕÇä ÞíãÉ ãÄÔÑ ßÊáÉ ÇáÌÓã Úä 18.5   ÞÏ íÌÑí ØÈíÈß ÝÍæÕÇÊ ÃÎÑì ááÊÍÞÞ ãä ÚÏã ÇáÊÚÑÖ áãÔßáÇÊ ÕÍíÉ.  ÇÓÊÔíÑí ØÈíÈß ÈÎÕæÕ ÇáÎØæÇÊ ÇáÊí íãßäß ÇÊÎÇÐåÇ ááÍÝÇÙ Úáì ÇáÕÍÉ Ãæ ÊÍÓíäåÇ. ÞÏ íáÒãß ÅÌÑÇÁ ÊÛííÑÇÊ Ýí äãØ ÇáÍíÇÉ áÒíÇÏÉ ÇáæÒä Ãæ UYISZF Úáíå [id number] Úáì ÇáÕÍÉ¡ ãËá ÊäÇæá ÇáãÒíÏ ãä ÇáÃØÚãÉ ÇáÕÍíÉ Ýí äÙÇãß ÇáÛÐÇÆí æããÇÑÓÉ ÇáÊãÇÑíä áÈäÇÁ ÇáÚÖáÇÊ. Ãíä íãßäß ãÚÑÝÉ ÇáãÒíÏ¿  ÇäÊÞÇá Åáì   [email or web address]  ÃÏÎá S176 Ýí ãÑÈÚ ÇáÈÍË áãÚÑÝÉ ÇáãÒíÏ Íæá \"ãÄÔÑ ßÊáÉ ÇáÌÓã: ÅÑÔÇÏÇÊ ÇáÑÚÇíÉ. \"  [facility] [id number] ãä: 27 ßÇäæä ÇáÃæá 2021               äÓÎÉ ÇáãÍÊæì: 13.2  © 6596-9219 [employer]. Êã ÊÚÏíá ÅÑÔÇÏÇÊ ÇáÑÚÇíÉ ÈãæÌÈ ÊÑÎíÕ ÕÇÏÑ ãä ÇÎÊÕÇÕí ÇáÑÚÇíÉ ÇáÕÍíÉ ÇáÎÇÕ Èß. ÅÐÇ ßÇäÊ áÏíß ÃÓÆáÉ WAIVM ÈÍÇáÉ ãÑÖíÉ Ãæ ÈåÐå ÇáÊÚáíãÇÊ¡ ÝÇÍÑÕ Úáì ÇáÑÌæÚ ÏÇÆãðÇ Åáì ÇÎÊÕÇÕí ÇáÑÚÇíÉ ÇáÕÍíÉ. ÊõÎáí ÔÑßÉ [facility] [employer] [facility] [employer] [id number] Úä Ãí ÖãÇä Ãæ ÇáÊÒÇã íÊÚáÞ [id number] áåÐå [id number].

## 2022-06-29 LAB
ALBUMIN SERPL-MCNC: 4.6 G/DL (ref 3.8–4.8)
ALBUMIN/GLOB SERPL: 1.7 {RATIO} (ref 1.2–2.2)
ALP SERPL-CCNC: 69 IU/L (ref 44–121)
ALT SERPL-CCNC: 13 IU/L (ref 0–32)
AST SERPL-CCNC: 15 IU/L (ref 0–40)
BASOPHILS # BLD AUTO: 0.1 X10E3/UL (ref 0–0.2)
BASOPHILS NFR BLD AUTO: 1 %
BILIRUB SERPL-MCNC: 0.7 MG/DL (ref 0–1.2)
BUN SERPL-MCNC: 10 MG/DL (ref 6–20)
BUN/CREAT SERPL: 18 (ref 9–23)
CALCIUM SERPL-MCNC: 9.3 MG/DL (ref 8.7–10.2)
CHLORIDE SERPL-SCNC: 102 MMOL/L (ref 96–106)
CO2 SERPL-SCNC: 20 MMOL/L (ref 20–29)
CREAT SERPL-MCNC: 0.57 MG/DL (ref 0.57–1)
EOSINOPHIL # BLD AUTO: 0.1 X10E3/UL (ref 0–0.4)
EOSINOPHIL NFR BLD AUTO: 1 %
ERYTHROCYTE [DISTWIDTH] IN BLOOD BY AUTOMATED COUNT: 12.8 % (ref 11.7–15.4)
EST. AVERAGE GLUCOSE BLD GHB EST-MCNC: 103 MG/DL
GLOBULIN SER CALC-MCNC: 2.7 G/DL (ref 1.5–4.5)
GLUCOSE SERPL-MCNC: 97 MG/DL (ref 65–99)
HBA1C MFR BLD: 5.2 % (ref 4.8–5.6)
HCT VFR BLD AUTO: 40.5 % (ref 34–46.6)
HGB BLD-MCNC: 13.9 G/DL (ref 11.1–15.9)
IMM GRANULOCYTES # BLD AUTO: 0 X10E3/UL (ref 0–0.1)
IMM GRANULOCYTES NFR BLD AUTO: 0 %
LYMPHOCYTES # BLD AUTO: 2.6 X10E3/UL (ref 0.7–3.1)
LYMPHOCYTES NFR BLD AUTO: 30 %
MCH RBC QN AUTO: 29.7 PG (ref 26.6–33)
MCHC RBC AUTO-ENTMCNC: 34.3 G/DL (ref 31.5–35.7)
MCV RBC AUTO: 87 FL (ref 79–97)
MONOCYTES # BLD AUTO: 0.6 X10E3/UL (ref 0.1–0.9)
MONOCYTES NFR BLD AUTO: 6 %
NEUTROPHILS # BLD AUTO: 5.4 X10E3/UL (ref 1.4–7)
NEUTROPHILS NFR BLD AUTO: 62 %
PLATELET # BLD AUTO: 314 X10E3/UL (ref 150–450)
POTASSIUM SERPL-SCNC: 4.1 MMOL/L (ref 3.5–5.2)
PROT SERPL-MCNC: 7.3 G/DL (ref 6–8.5)
RBC # BLD AUTO: 4.68 X10E6/UL (ref 3.77–5.28)
SODIUM SERPL-SCNC: 136 MMOL/L (ref 134–144)
T4 FREE SERPL-MCNC: 1.49 NG/DL (ref 0.82–1.77)
TSH SERPL DL<=0.005 MIU/L-ACNC: 0.92 UIU/ML (ref 0.45–4.5)
WBC # BLD AUTO: 8.8 X10E3/UL (ref 3.4–10.8)

## 2022-10-11 ENCOUNTER — TELEPHONE (OUTPATIENT)
Dept: ENDOCRINOLOGY | Age: 32
End: 2022-10-11

## 2022-10-11 NOTE — TELEPHONE ENCOUNTER
10/11/2022  2:18 PM    Pt would like to know do she need to have lab work done. YRUDDY#[phone]      Thanks,  Evin Dash

## 2022-10-11 NOTE — TELEPHONE ENCOUNTER
Called and left a message that labs were ordered at her last appt and will be needed for her upcoming appt.

## 2022-10-25 ENCOUNTER — OFFICE VISIT (OUTPATIENT)
Dept: ENDOCRINOLOGY | Age: 32
End: 2022-10-25
Payer: MEDICAID

## 2022-10-25 VITALS
DIASTOLIC BLOOD PRESSURE: 65 MMHG | SYSTOLIC BLOOD PRESSURE: 89 MMHG | HEIGHT: 66 IN | BODY MASS INDEX: 29.11 KG/M2 | HEART RATE: 88 BPM | RESPIRATION RATE: 16 BRPM | WEIGHT: 181.1 LBS

## 2022-10-25 DIAGNOSIS — L68.0 HIRSUTISM: ICD-10-CM

## 2022-10-25 DIAGNOSIS — E04.1 THYROID NODULE: ICD-10-CM

## 2022-10-25 DIAGNOSIS — E04.1 THYROID NODULE: Primary | ICD-10-CM

## 2022-10-25 PROCEDURE — 99214 OFFICE O/P EST MOD 30 MIN: CPT | Performed by: INTERNAL MEDICINE

## 2022-10-25 RX ORDER — LEVOTHYROXINE SODIUM 50 UG/1
25 TABLET ORAL
Qty: 45 TABLET | Refills: 3 | Status: SHIPPED | OUTPATIENT
Start: 2022-10-25

## 2022-10-25 RX ORDER — NORGESTIMATE AND ETHINYL ESTRADIOL 7DAYSX3 LO
1 KIT ORAL DAILY
Qty: 90 EACH | Refills: 3 | Status: SHIPPED | OUTPATIENT
Start: 2022-10-25

## 2022-10-25 NOTE — PROGRESS NOTES
Chief Complaint   Patient presents with    Thyroid Problem    Follow-up     CHIEF COMPLAINT: f/u Thyroid nodule, hashimoto thyroiditis    [phone] Carlos Peck     HISTORY OF PRESENT ILLNESS:   Charles Conn is a 28 y.o. female with with minimal past medical history seen in transfer from her previous endocrinologist for f/u of a thyroid nodule and goiter. Previously with [facility]:  Patient described that she had some dysphagia felt a lump in her neck,  She reported some hair loss in addition to the mild dysphagia,  A thyroid ultrasound was obtained by her primary doctor, see below,  Family history of thyroid disorder is noted in sister, treated,  No known radiation exposure. No history of hyper or hypothyroidism. 6/2/20: Thyroid Ultrasound:    Mildly enlarged: R lobe 5.5 cm, L lobe 5.8 cm   Solitary L lower 9 mm solid thyroid nodule   No other description in report    We discussed biopsy vs surveillance US and she elected surveillance. 12/7/20: Thyroid Ultrasound   Unchanged 1.1 x 0.7 x 0.7 cm left lower nodule    She was noted for TPO+ finding,  TSH repeated now at 4.47, symptomatic    06/07/2021: Endorses dyspnea, headache. Dyspnea has occurred since prior to taking the med, for 4 years. Present at rest and on exertion. 6 months ago had blood tests. Endorses dysphagia. Not interest in pursuing fertility in next 1 year. When describing thyroid fullness points to the submandibular area. Reports that she was told that she could have her thyroid taken out previously. Has not noticed any improvement in symptoms with initiation of levothyroxine. Is requesting a medication for weight loss. 08/16/2021: Is headed to [facility]) today, they're going to visit [facility]. Has been taking levothyroxine 25mcg since we spoke last in June. Is running out of prescription. Endorses fullness in the neck- was never called to have the ultrasound done. Sometimes has a headache but not all the time.  No difference since starting levothyroxine. March 25, 2022: Reports difficulty losing weight despite eating 1600 alexander a day and exercising. Otherwise no changes. Not actively attempting to conceive. 10/25/2022: Describes feeling like she cannot swallow well when she eats no matter how small the bite. Feeling pain in the thyroid and that it is enlarge. Endorses tremor and palpitations on the levothyroxine 50mcg. Bothered by hirsutism, has previously been on OCP, would like to resume. Is not nursing or planning pregnancy. No hx of dvt or migraines. ALLERGIES:   No Known Allergies    MEDICATIONS ON ADMISSION:     Current Outpatient Medications:     levothyroxine (SYNTHROID) 50 mcg tablet, Take 1 Tablet by mouth Daily (before breakfast). , Disp: 90 Tablet, Rfl: 3    Orlistat 60 mg capsule, Take 60 mg by mouth three (3) times daily (with meals).  (Patient not taking: Reported on 10/25/2022), Disp: 90 Capsule, Rfl: 3    SOCIAL HISTORY: , never smoker    FAMILY HISTORY:  Family History   Problem Relation Age of Onset    Diabetes Mother     Thyroid Disease Mother     Hypertension Father        REVIEW OF SYSTEMS: See HPI    PHYSICAL EXAMINATION:  Visit Vitals  BP (!) 89/65   Pulse 88   Resp 16   Ht 5' 6\" (1.676 m)   Wt 181 lb 1.6 oz (82.1 kg)   BMI 29.23 kg/m²     GENERAL: NCAT, Appears well nourished  EYES: EOMI, non-icteric, no proptosis  Ear/Nose/Throat: Mild thyromegaly is present  CARDIOVASCULAR: no cyanosis, no visible JVD  RESPIRATORY: Clear to auscultation, moving air well  MUSCULOSKELETAL: Normal ROM of upper extremities observed  SKIN: No edema, rash, or other significant changes observed  NEUROLOGIC:  AAOx3  PSYCHIATRIC: Normal affect, Normal insight and judgement               Latest Reference Range & Units 6/28/22 16:20   T4, Free 0.82 - 1.77 ng/dL 1.49   TSH 0.450 - 4.500 uIU/mL 0.920           ASSESSMENT AND PLAN:   Guzman Peters is a 28 y.o. female seen in follow-up for discussion related to subclinical hypothyroidism with positive thyroperoxidase antibodies and a thyroid nodule. She previously endorsed dyspnea, dysphagia. When reviewing where the fullness is in the thyroid, she points to the submandibular area. Reviewed the fact that her esophagus and trachea were patent on the most recent thyroid ultrasound and that the thyroid nodule that is present is roughly the size of a grain of rice. Drop back to 25mcg of levothyroxine, repeat ultrasound in 03/2023. Initiate OCP- reviewed risk of DVT and PE with long haul travel. #thyroid nodule, dysphagia/dyspnea  -By virtue of being isoechoic and less than 1.5 cm in size, this nodule does not meet 2015 American thyroid Association guidelines for fine-needle aspiration at this time  -Repeat thyroid ultrasound in 03/2023    #Subclinical hypothyroidism with positive thyroperoxidase antibodies  -Begun on 25 mcg of levothyroxine in December 2020, increased to 50 mcg March 2022- drop back to 10/2022  -Repeat TSH and free T4 in March  -Goal TSH less than 1.5 if she were interested in pursuing fertility but she is not at this time. Copy sent to: Mariaelena Veronica NP    Return to care: 6 months    Johnnie Mai MD  [facility]

## 2023-02-02 ENCOUNTER — TELEPHONE (OUTPATIENT)
Dept: INTERNAL MEDICINE CLINIC | Age: 33
End: 2023-02-02

## 2023-02-02 DIAGNOSIS — L70.0 ACNE VULGARIS: Primary | ICD-10-CM

## 2023-02-02 NOTE — TELEPHONE ENCOUNTER
----- Message from [facility] sent at 2/2/2023  1:54 PM EST -----  Subject: Referral Request    Reason for referral request? Dermatology for pimples on face  Provider patient wants to be referred to(if known):     Provider Phone Number(if known): Additional Information for Provider?  ### Arabic  needed  ---------------------------------------------------------------------------  --------------  [address] [facility]    [phone]; OK to leave message on voicemail  ---------------------------------------------------------------------------  --------------

## 2023-03-14 ENCOUNTER — HOSPITAL ENCOUNTER (OUTPATIENT)
Dept: ULTRASOUND IMAGING | Age: 33
Discharge: HOME | End: 2023-03-14
Attending: INTERNAL MEDICINE
Payer: MEDICAID

## 2023-03-14 DIAGNOSIS — E04.1 THYROID NODULE: ICD-10-CM

## 2023-03-14 PROCEDURE — 76536 US EXAM OF HEAD AND NECK: CPT

## 2023-03-28 ENCOUNTER — VIRTUAL VISIT (OUTPATIENT)
Dept: ENDOCRINOLOGY | Age: 33
End: 2023-03-28
Payer: MEDICAID

## 2023-03-28 DIAGNOSIS — E04.1 THYROID NODULE: ICD-10-CM

## 2023-03-28 DIAGNOSIS — L70.9 ACNE, UNSPECIFIED ACNE TYPE: Primary | ICD-10-CM

## 2023-03-28 PROCEDURE — 99215 OFFICE O/P EST HI 40 MIN: CPT | Performed by: INTERNAL MEDICINE

## 2023-03-28 RX ORDER — LEVOTHYROXINE SODIUM 50 UG/1
50 TABLET ORAL
Qty: 90 TABLET | Refills: 3 | Status: SHIPPED | OUTPATIENT
Start: 2023-03-28

## 2023-03-28 NOTE — PROGRESS NOTES
Chief Complaint   Patient presents with    Thyroid Problem    Follow-up     CHIEF COMPLAINT: f/u Thyroid nodule, hashimoto thyroiditis    [phone] aCrlos Peck     HISTORY OF PRESENT ILLNESS:   Wilber Runner is a 35 y.o. female with with minimal past medical history seen in transfer from her previous endocrinologist for f/u of a thyroid nodule and goiter. Previously with Dari June:  Patient described that she had some dysphagia felt a lump in her neck,  She reported some hair loss in addition to the mild dysphagia,  A thyroid ultrasound was obtained by her primary doctor, see below,  Family history of thyroid disorder is noted in sister, treated,  No known radiation exposure. No history of hyper or hypothyroidism. 6/2/20: Thyroid Ultrasound:    Mildly enlarged: R lobe 5.5 cm, L lobe 5.8 cm   Solitary L lower 9 mm solid thyroid nodule   No other description in report    We discussed biopsy vs surveillance US and she elected surveillance. 12/7/20: Thyroid Ultrasound   Unchanged 1.1 x 0.7 x 0.7 cm left lower nodule    She was noted for TPO+ finding,  TSH repeated now at 4.47, symptomatic    06/07/2021: Endorses dyspnea, headache. Dyspnea has occurred since prior to taking the med, for 4 years. Present at rest and on exertion. 6 months ago had blood tests. Endorses dysphagia. Not interest in pursuing fertility in next 1 year. When describing thyroid fullness points to the submandibular area. Reports that she was told that she could have her thyroid taken out previously. Has not noticed any improvement in symptoms with initiation of levothyroxine. Is requesting a medication for weight loss. 08/16/2021: Is headed to [facility] today, they're going to visit [facility]. Has been taking levothyroxine 25mcg since we spoke last in June. Is running out of prescription. Endorses fullness in the neck- was never called to have the ultrasound done. Sometimes has a headache but not all the time.  No difference since starting levothyroxine. March 25, 2022: Reports difficulty losing weight despite eating 1600 alexander a day and exercising. Otherwise no changes. Not actively attempting to conceive. 10/25/2022: Describes feeling like she cannot swallow well when she eats no matter how small the bite. Feeling pain in the thyroid and that it is enlarge. Endorses tremor and palpitations on the levothyroxine 50mcg. Bothered by hirsutism, has previously been on OCP, would like to resume. Is not nursing or planning pregnancy. No hx of dvt or migraines. 03/28/2023: Taking 50mcg, hirsutism did not improve with OCP. Had acne develop AFTER taking OCP, has not seen derm. D/c OCP. Bothered by weight gain despite counting calories. No interest in pursuing fertility in the near future. ALLERGIES:   No Known Allergies    MEDICATIONS ON ADMISSION:     Current Outpatient Medications:     levothyroxine (SYNTHROID) 50 mcg tablet, Take 0.5 Tablets by mouth Daily (before breakfast). , Disp: 45 Tablet, Rfl: 3    norgestimate-ethinyl estradioL (ORTHO TRI-CYCLEN LO) 0.18/0.215/0.25 mg-25 mcg tab, Take 1 Tablet by mouth daily. (Patient not taking: Reported on 3/28/2023), Disp: 90 Each, Rfl: 3    Orlistat 60 mg capsule, Take 60 mg by mouth three (3) times daily (with meals). (Patient not taking: No sig reported), Disp: 90 Capsule, Rfl: 3    SOCIAL HISTORY: , never smoker    FAMILY HISTORY:  Family History   Problem Relation Age of Onset    Diabetes Mother     Thyroid Disease Mother     Hypertension Father        REVIEW OF SYSTEMS: See HPI    PHYSICAL EXAMINATION:  There were no vitals taken for this visit.     GENERAL: NCAT, Appears well nourished  EYES: EOMI, non-icteric, no proptosis  Ear/Nose/Throat: Mild thyromegaly is present  CARDIOVASCULAR: no cyanosis, no visible JVD  RESPIRATORY: Clear to auscultation, moving air well  MUSCULOSKELETAL: Normal ROM of upper extremities observed  SKIN: No edema, rash, or other significant changes observed  NEUROLOGIC:  AAOx3  PSYCHIATRIC: Normal affect, Normal insight and judgement    2022:        2023:         Latest Reference Range & Units 6/28/22 16:20   T4, Free 0.82 - 1.77 ng/dL 1.49   TSH 0.450 - 4.500 uIU/mL 0.920           ASSESSMENT AND PLAN:   Tres Cordoba is a 35 y.o. female seen in follow-up for discussion related to subclinical hypothyroidism with positive thyroperoxidase antibodies and a thyroid nodule. She previously endorsed dyspnea, dysphagia. When reviewing where the fullness is in the thyroid, she points to the submandibular area. Reviewed the fact that her esophagus and trachea were patent on the most recent thyroid ultrasound and that the thyroid nodule that is present is roughly the size of a grain of rice. Drop back to 25mcg of levothyroxine, repeat ultrasound in 03/2023. Initiate OCP- reviewed risk of DVT and PE with long haul travel. #thyroid nodule, dysphagia/dyspnea  -By virtue of being isoechoic and less than 1.5 cm in size, this nodule does not meet 2015 American thyroid Association guidelines for fine-needle aspiration at this time  -table as of 2023, repeat ultrasound annually    #Subclinical hypothyroidism with positive thyroperoxidase antibodies  -Begun on 25 mcg of levothyroxine in December 2020, increased to 50 mcg March 2022- has continued on this dose since last visit, TFTs normal  -increase to 75mcg if conceives   -Repeat TSH and free T4 in March  -Goal TSH less than 1.5 if she were interested in pursuing fertility but she is not at this time. #BMI 29  -encourage continued dietary efforts  -discussed phentermine risks 03/2023    #acne  -recalcitrant to OCP  -referral to derm    Copy sent to: Lacey Christy NP    Return to care: 1 year    40+ minutes spent face to face and reviewing records (labs/notes/studies)-      Tres Cordoba is a 35 y.o. female being evaluated by a Virtual Visit (video visit) encounter to address concerns as mentioned above. A caregiver was present when appropriate. Due to this being a TeleHealth encounter (During UBK-28 public health emergency), evaluation of the following organ systems was limited:     Vitals/Constitutional/EENT/Resp/CV/GI//MS/Neuro/Skin/Heme-Lymph-Imm. Pursuant to the emergency declaration under the [address] [place] [address], [address] [place] [address] and the [employer] and [employer] Act, this Virtual Visit was conducted with patient's (and/or legal guardian's) consent, to reduce the risk of exposure to COVID-19 and provide necessary medical care. Services were provided through a video synchronous discussion virtually to substitute for in-person encounter. Jessica Salazar MD  [facility]     An electronic signature was used to authenticate this note.

## 2023-04-23 DIAGNOSIS — E04.1 THYROID NODULE: Primary | ICD-10-CM

## 2023-05-16 RX ORDER — LEVOTHYROXINE SODIUM 0.05 MG/1
50 TABLET ORAL
COMMUNITY
Start: 2023-03-28

## 2023-05-25 ENCOUNTER — NURSE TRIAGE (OUTPATIENT)
Dept: OTHER | Facility: CLINIC | Age: 33
End: 2023-05-25

## 2023-05-25 NOTE — TELEPHONE ENCOUNTER
Tre Billings already on the call when transferred  from [facility] ([facility]). Location of patient: [facility] call from [facility] at [facility] with [employer]. Subjective: Caller states \"I have a headache. \"     Current Symptoms: Also with lower back pain. Onset: 1 day ago; worsening    Associated Symptoms: NA    Pain Severity: 7/10 after Ibuprofen; ; constant    Temperature: denies fever     What has been tried: Ibuprofen    LMP: NA Pregnant: No    Recommended disposition: Go to ED/[facility] Now (Or to Office with PCP Approval)    Care advice provided, patient verbalizes understanding; denies any other questions or concerns; instructed to call back for any new or worsening symptoms. Writer provided warm transfer to [place] at [facility] and  for second level triage. Attention Provider: Thank you for allowing me to participate in the care of your patient. The patient was connected to triage in response to information provided to the ECC/PSC. Please do not respond through this encounter as the response is not directed to a shared pool.     Reason for Disposition   SEVERE headache, states 'worst headache' of life    Protocols used: Headache-ADULT-[place]

## 2023-06-23 ENCOUNTER — OFFICE VISIT (OUTPATIENT)
Age: 33
End: 2023-06-23
Payer: COMMERCIAL

## 2023-06-23 VITALS
HEART RATE: 82 BPM | BODY MASS INDEX: 29.22 KG/M2 | RESPIRATION RATE: 17 BRPM | TEMPERATURE: 98.2 F | SYSTOLIC BLOOD PRESSURE: 92 MMHG | DIASTOLIC BLOOD PRESSURE: 65 MMHG | HEIGHT: 66 IN | WEIGHT: 181.8 LBS | OXYGEN SATURATION: 98 %

## 2023-06-23 DIAGNOSIS — L70.0 ACNE VULGARIS: ICD-10-CM

## 2023-06-23 DIAGNOSIS — J06.9 UPPER RESPIRATORY TRACT INFECTION, UNSPECIFIED TYPE: ICD-10-CM

## 2023-06-23 DIAGNOSIS — R73.9 HYPERGLYCEMIA: Primary | ICD-10-CM

## 2023-06-23 LAB — HBA1C MFR BLD: 4.9 %

## 2023-06-23 PROCEDURE — 99214 OFFICE O/P EST MOD 30 MIN: CPT | Performed by: NURSE PRACTITIONER

## 2023-06-23 PROCEDURE — 83036 HEMOGLOBIN GLYCOSYLATED A1C: CPT | Performed by: NURSE PRACTITIONER

## 2023-06-23 RX ORDER — FLUTICASONE PROPIONATE 50 MCG
2 SPRAY, SUSPENSION (ML) NASAL DAILY
Qty: 48 G | Refills: 1 | Status: SHIPPED | OUTPATIENT
Start: 2023-06-23

## 2023-06-23 RX ORDER — AMOXICILLIN AND CLAVULANATE POTASSIUM 875; 125 MG/1; MG/1
1 TABLET, FILM COATED ORAL 2 TIMES DAILY
Qty: 20 TABLET | Refills: 0 | Status: SHIPPED | OUTPATIENT
Start: 2023-06-23 | End: 2023-07-03

## 2023-06-23 RX ORDER — TRETINOIN 0.05 G/100G
GEL TOPICAL
Qty: 45 G | Refills: 0 | Status: SHIPPED | OUTPATIENT
Start: 2023-06-23 | End: 2023-07-01 | Stop reason: ALTCHOICE

## 2023-06-23 ASSESSMENT — PATIENT HEALTH QUESTIONNAIRE - PHQ9
2. FEELING DOWN, DEPRESSED OR HOPELESS: 0
SUM OF ALL RESPONSES TO PHQ9 QUESTIONS 1 & 2: 0
SUM OF ALL RESPONSES TO PHQ QUESTIONS 1-9: 0
1. LITTLE INTEREST OR PLEASURE IN DOING THINGS: 0
SUM OF ALL RESPONSES TO PHQ QUESTIONS 1-9: 0

## 2023-06-26 ENCOUNTER — TELEPHONE (OUTPATIENT)
Age: 33
End: 2023-06-26

## 2023-06-26 DIAGNOSIS — L70.0 ACNE VULGARIS: ICD-10-CM

## 2023-06-26 RX ORDER — TRETINOIN 0.05 G/100G
GEL TOPICAL
Qty: 45 G | Refills: 0 | Status: CANCELLED | OUTPATIENT
Start: 2023-06-26

## 2023-06-30 ASSESSMENT — ENCOUNTER SYMPTOMS: RESPIRATORY NEGATIVE: 1

## 2023-07-01 RX ORDER — TRETINOIN 0.5 MG/G
CREAM TOPICAL
Qty: 45 G | Refills: 0 | Status: SHIPPED | OUTPATIENT
Start: 2023-07-01 | End: 2023-07-31

## 2024-01-02 ENCOUNTER — HOSPITAL ENCOUNTER (EMERGENCY)
Facility: HOSPITAL | Age: 34
Discharge: HOME | End: 2024-01-02
Attending: STUDENT IN AN ORGANIZED HEALTH CARE EDUCATION/TRAINING PROGRAM
Payer: MEDICAID

## 2024-01-02 ENCOUNTER — OFFICE VISIT (OUTPATIENT)
Age: 34
End: 2024-01-02
Payer: MEDICAID

## 2024-01-02 VITALS
BODY MASS INDEX: 29.57 KG/M2 | SYSTOLIC BLOOD PRESSURE: 101 MMHG | HEART RATE: 78 BPM | HEIGHT: 66 IN | DIASTOLIC BLOOD PRESSURE: 68 MMHG | WEIGHT: 184 LBS

## 2024-01-02 VITALS
RESPIRATION RATE: 16 BRPM | SYSTOLIC BLOOD PRESSURE: 115 MMHG | BODY MASS INDEX: 28.93 KG/M2 | DIASTOLIC BLOOD PRESSURE: 68 MMHG | HEIGHT: 66 IN | TEMPERATURE: 98.1 F | WEIGHT: 180 LBS | HEART RATE: 77 BPM | OXYGEN SATURATION: 99 %

## 2024-01-02 DIAGNOSIS — E04.1 NONTOXIC SINGLE THYROID NODULE: ICD-10-CM

## 2024-01-02 DIAGNOSIS — E04.1 NONTOXIC SINGLE THYROID NODULE: Primary | ICD-10-CM

## 2024-01-02 DIAGNOSIS — E06.3 AUTOIMMUNE THYROIDITIS: ICD-10-CM

## 2024-01-02 DIAGNOSIS — D23.5: Primary | ICD-10-CM

## 2024-01-02 PROCEDURE — 99214 OFFICE O/P EST MOD 30 MIN: CPT | Performed by: INTERNAL MEDICINE

## 2024-01-02 PROCEDURE — 99283 EMERGENCY DEPT VISIT LOW MDM: CPT

## 2024-01-02 RX ORDER — NORGESTIMATE AND ETHINYL ESTRADIOL 7DAYSX3 28
1 KIT ORAL DAILY
COMMUNITY
Start: 2023-12-20

## 2024-01-02 RX ORDER — BACITRACIN ZINC AND POLYMYXIN B SULFATE 500; 1000 [USP'U]/G; [USP'U]/G
OINTMENT TOPICAL
Qty: 15 G | Refills: 1 | Status: SHIPPED | OUTPATIENT
Start: 2024-01-02 | End: 2024-01-09

## 2024-01-02 ASSESSMENT — PAIN DESCRIPTION - DESCRIPTORS
DESCRIPTORS: DISCOMFORT
DESCRIPTORS: TENDER

## 2024-01-02 ASSESSMENT — PAIN - FUNCTIONAL ASSESSMENT
PAIN_FUNCTIONAL_ASSESSMENT: 0-10
PAIN_FUNCTIONAL_ASSESSMENT: 0-10
PAIN_FUNCTIONAL_ASSESSMENT: PREVENTS OR INTERFERES SOME ACTIVE ACTIVITIES AND ADLS

## 2024-01-02 ASSESSMENT — PAIN SCALES - GENERAL
PAINLEVEL_OUTOF10: 8
PAINLEVEL_OUTOF10: 8

## 2024-01-02 ASSESSMENT — PAIN DESCRIPTION - LOCATION
LOCATION: BACK
LOCATION: BACK

## 2024-01-02 ASSESSMENT — PAIN DESCRIPTION - FREQUENCY: FREQUENCY: CONTINUOUS

## 2024-01-02 ASSESSMENT — PAIN DESCRIPTION - ORIENTATION
ORIENTATION: MID
ORIENTATION: MID

## 2024-01-02 ASSESSMENT — PAIN DESCRIPTION - PAIN TYPE: TYPE: ACUTE PAIN

## 2024-01-02 NOTE — PROGRESS NOTES
Chief Complaint   Patient presents with    Thyroid Problem        CHIEF COMPLAINT : f/u Thyroid nodule, hashimoto thyroiditis      [phone] Sharonda Marquis       HISTORY OF PRESENT ILLNESS:    Mishel Stern is a 33 y.o.  female with with minimal past medical history seen in transfer from her previous endocrinologist for f/u of a thyroid nodule and goiter.       Previously with Keny:   Patient described that she had some dysphagia felt a lump in her neck,   She reported some hair loss in addition to the mild dysphagia,   A thyroid ultrasound was obtained by her primary doctor, see below,   Family history of thyroid disorder is noted in sister, treated,   No known radiation exposure.   No history of hyper or hypothyroidism.       6/2/20: Thyroid Ultrasound:     Mildly enlarged: R lobe 5.5 cm, L lobe 5.8 cm    Solitary L lower 9 mm solid thyroid nodule    No other description in report      We discussed biopsy vs surveillance US and she elected surveillance.       12/7/20: Thyroid Ultrasound    Unchanged 1.1 x 0.7 x 0.7 cm left lower nodule      She was noted for TPO+ finding,   TSH repeated now at 4.47, symptomatic      06/07/2021: Endorses dyspnea, headache. Dyspnea has occurred since prior to taking the med, for 4 years. Present at rest and on exertion. 6  months ago had blood tests. Endorses dysphagia. Not interest in pursuing fertility in next 1 year.  When describing thyroid fullness points to the submandibular area.  Reports that she was told that she could have her thyroid taken out previously.  Has  not noticed any improvement in symptoms with initiation of levothyroxine.  Is requesting a medication for weight loss.      08/16/2021: Is headed to [place] today, they're going to visit [facility]. Has been taking levothyroxine 25mcg since we spoke last in June.  Is running out of prescription. Endorses fullness in the neck- was never called to have the ultrasound done. Sometimes has a headache but

## 2024-01-03 ENCOUNTER — TELEPHONE (OUTPATIENT)
Age: 34
End: 2024-01-03

## 2024-01-03 LAB
T4 FREE SERPL-MCNC: 1.4 NG/DL (ref 0.82–1.77)
TSH SERPL DL<=0.005 MIU/L-ACNC: 1.79 UIU/ML (ref 0.45–4.5)

## 2024-01-03 NOTE — ED TRIAGE NOTES
reports patient has had a boil on mid back for months. Denies drainage. Pt reports it just started hurting.

## 2024-01-03 NOTE — TELEPHONE ENCOUNTER
ECC trsfr: Pt called to make ED F/up appt. Added Icelandic  to call and sched'd a NTP appt for 3/20/24 w/ Dr. Kent. Pt will call ins to get list of dermatologists to f/up on 1/03/24 ER visit

## 2024-01-03 NOTE — ED PROVIDER NOTES
[facility] EMERGENCY DEPT  EMERGENCY DEPARTMENT ENCOUNTER      Pt Name: Mishel Stern  MRN: [id number]  Birthdate 1990  Date of evaluation: 2024  Provider: Jayesh Crowley MD    CHIEF COMPLAINT       Chief Complaint   Patient presents with    Wound Check       HISTORY OF PRESENT ILLNESS    HPI    33-year-old female presenting for muscular back.  Has been present for several months but over the last week has become more painful and has been draining thin yellow fluid.  She denies fevers, chills or any other constitutional symptoms.  Nursing notes reviewed.    REVIEW OF SYSTEMS     Review of Systems  Unless otherwise stated, a complete review of systems was asked of the patient. Pertinent positives are noted in the HPI section.    PAST MEDICAL HISTORY     Past Medical History:   Diagnosis Date    Subchorionic hemorrhage in first trimester 2019       SURGICAL HISTORY       Past Surgical History:   Procedure Laterality Date     SECTION      x 2    DILATION AND CURETTAGE OF UTERUS      post ectopic pregnancy    TONSILLECTOMY         CURRENT MEDICATIONS       Discharge Medication List as of 2024  9:23 PM        CONTINUE these medications which have NOT CHANGED    Details   TRI-ESTARYLLA 0.18/0.215/0.25 MG-35 MCG TABS Take 1 tablet by mouth daily, DAWHistorical Med      fluticasone (FLONASE) 50 MCG/ACT nasal spray 2 sprays by Each Nostril route daily, Disp-48 g, R-1Normal      levothyroxine (SYNTHROID) 50 MCG tablet Take 1 tablet by mouth every morning (before breakfast)Historical Med             ALLERGIES     Patient has no known allergies.    FAMILY HISTORY       Family History   Problem Relation Age of Onset    Thyroid Disease Mother     Hypertension Father     Diabetes Mother         SOCIAL HISTORY       Social History     Socioeconomic History    Marital status:      Spouse name: None    Number of children: None    Years of education: None    Highest education level: None   Tobacco

## 2024-01-04 RX ORDER — LEVOTHYROXINE SODIUM 0.05 MG/1
50 TABLET ORAL
Qty: 90 TABLET | Refills: 3 | Status: SHIPPED | OUTPATIENT
Start: 2024-01-04

## 2024-01-07 ENCOUNTER — OFFICE VISIT (OUTPATIENT)
Age: 34
End: 2024-01-07

## 2024-01-07 VITALS
WEIGHT: 185 LBS | HEART RATE: 78 BPM | OXYGEN SATURATION: 99 % | DIASTOLIC BLOOD PRESSURE: 78 MMHG | SYSTOLIC BLOOD PRESSURE: 111 MMHG | BODY MASS INDEX: 29.86 KG/M2 | TEMPERATURE: 98.2 F

## 2024-01-07 DIAGNOSIS — J01.90 ACUTE BACTERIAL SINUSITIS: Primary | ICD-10-CM

## 2024-01-07 DIAGNOSIS — B96.89 ACUTE BACTERIAL SINUSITIS: Primary | ICD-10-CM

## 2024-01-07 DIAGNOSIS — R05.1 ACUTE COUGH: ICD-10-CM

## 2024-01-07 RX ORDER — DEXTROMETHORPHAN HYDROBROMIDE AND PROMETHAZINE HYDROCHLORIDE 15; 6.25 MG/5ML; MG/5ML
5 SYRUP ORAL 4 TIMES DAILY PRN
Qty: 118 ML | Refills: 0 | Status: SHIPPED | OUTPATIENT
Start: 2024-01-07 | End: 2024-01-14

## 2024-01-07 RX ORDER — AMOXICILLIN AND CLAVULANATE POTASSIUM 875; 125 MG/1; MG/1
1 TABLET, FILM COATED ORAL 2 TIMES DAILY
Qty: 20 TABLET | Refills: 0 | Status: SHIPPED | OUTPATIENT
Start: 2024-01-07 | End: 2024-01-17

## 2024-01-07 ASSESSMENT — ENCOUNTER SYMPTOMS
SINUS PRESSURE: 1
EYES NEGATIVE: 1
SORE THROAT: 1
RHINORRHEA: 1
GASTROINTESTINAL NEGATIVE: 1
ALLERGIC/IMMUNOLOGIC NEGATIVE: 1
COUGH: 1

## 2024-02-13 ENCOUNTER — NURSE TRIAGE (OUTPATIENT)
Dept: OTHER | Facility: CLINIC | Age: 34
End: 2024-02-13

## 2024-02-13 NOTE — TELEPHONE ENCOUNTER
Received call from Hollie at [facility], caller not on line.     Practice Name: unsetablished-wanting to establish at [facility]    Caller's telephone number verified as [phone]    Connected with caller via phone, please see below triage     Yoruba  ID: [id number]    Location of patient: [place]    Subjective: Caller states \"it has been going on for 10 days, each time I eat, the food gets stuck\"     Current Symptoms: swallowing difficulty, pt describes food getting stuck. Pt having difficulty with water as well. Reports pain in esophagus when trying to swallow    Onset: 10 days ago; intermittent    Associated Symptoms: reduced appetite    Pain Severity: 0/10; N/A; none    Temperature: no fever     What has been tried: NA    Recommended disposition: Go to ED Now    Care advice provided, patient verbalizes understanding; denies any other questions or concerns; instructed to call back for any new or worsening symptoms.    Patient/caller agrees to proceed to the Emergency Department    Attention Provider:  Thank you for allowing me to participate in the care of your patient.  The patient was connected to triage in response to information provided to the [facility].  Please do not respond through this encounter as the response is not directed to a shared pool.      Reason for Disposition   Symptoms of food or bone stuck in throat or esophagus (e.g., pain in throat or chest, FB sensation, blood-tinged saliva)    Protocols used: Swallowing Difficulty-ADULT-

## 2024-03-04 ENCOUNTER — HOSPITAL ENCOUNTER (OUTPATIENT)
Facility: HOSPITAL | Age: 34
Discharge: HOME | End: 2024-03-07
Attending: INTERNAL MEDICINE
Payer: MEDICAID

## 2024-03-04 DIAGNOSIS — E04.1 NONTOXIC SINGLE THYROID NODULE: ICD-10-CM

## 2024-03-04 PROCEDURE — 76536 US EXAM OF HEAD AND NECK: CPT

## 2024-04-17 ENCOUNTER — TELEMEDICINE (OUTPATIENT)
Age: 34
End: 2024-04-17
Payer: MEDICAID

## 2024-04-17 DIAGNOSIS — Z31.69 PRE-CONCEPTION COUNSELING: ICD-10-CM

## 2024-04-17 DIAGNOSIS — E06.3 AUTOIMMUNE THYROIDITIS: Primary | ICD-10-CM

## 2024-04-17 DIAGNOSIS — E06.3 AUTOIMMUNE THYROIDITIS: ICD-10-CM

## 2024-04-17 PROCEDURE — 99214 OFFICE O/P EST MOD 30 MIN: CPT | Performed by: INTERNAL MEDICINE

## 2024-04-17 RX ORDER — LEVOTHYROXINE SODIUM 0.05 MG/1
50 TABLET ORAL
Qty: 90 TABLET | Refills: 3 | Status: SHIPPED | OUTPATIENT
Start: 2024-04-17

## 2024-04-17 NOTE — PROGRESS NOTES
Chief Complaint   Patient presents with    Thyroid Problem        CHIEF COMPLAINT : f/u Thyroid nodule, hashimoto thyroiditis      [phone] Sharonda Marquis       HISTORY OF PRESENT ILLNESS:    Mishel Stern is a 33 y.o.  female with with minimal past medical history seen in transfer from her previous endocrinologist for f/u of a thyroid nodule and goiter.       Previously with Keny:   Patient described that she had some dysphagia felt a lump in her neck,   She reported some hair loss in addition to the mild dysphagia,   A thyroid ultrasound was obtained by her primary doctor, see below,   Family history of thyroid disorder is noted in sister, treated,   No known radiation exposure.   No history of hyper or hypothyroidism.       6/2/20: Thyroid Ultrasound:     Mildly enlarged: R lobe 5.5 cm, L lobe 5.8 cm    Solitary L lower 9 mm solid thyroid nodule    No other description in report      We discussed biopsy vs surveillance US and she elected surveillance.       12/7/20: Thyroid Ultrasound    Unchanged 1.1 x 0.7 x 0.7 cm left lower nodule      She was noted for TPO+ finding,   TSH repeated now at 4.47, symptomatic      06/07/2021: Endorses dyspnea, headache. Dyspnea has occurred since prior to taking the med, for 4 years. Present at rest and on exertion. 6  months ago had blood tests. Endorses dysphagia. Not interest in pursuing fertility in next 1 year.  When describing thyroid fullness points to the submandibular area.  Reports that she was told that she could have her thyroid taken out previously.  Has  not noticed any improvement in symptoms with initiation of levothyroxine.  Is requesting a medication for weight loss.      08/16/2021: Is headed to [place] today, they're going to visit [facility]. Has been taking levothyroxine 25mcg since we spoke last in June.  Is running out of prescription. Endorses fullness in the neck- was never called to have the ultrasound done. Sometimes has a headache but

## 2024-04-22 ENCOUNTER — OFFICE VISIT (OUTPATIENT)
Age: 34
End: 2024-04-22
Payer: MEDICAID

## 2024-04-22 VITALS
RESPIRATION RATE: 18 BRPM | SYSTOLIC BLOOD PRESSURE: 107 MMHG | WEIGHT: 182.6 LBS | DIASTOLIC BLOOD PRESSURE: 72 MMHG | BODY MASS INDEX: 29.35 KG/M2 | HEART RATE: 76 BPM | HEIGHT: 66 IN | TEMPERATURE: 97.7 F | OXYGEN SATURATION: 99 %

## 2024-04-22 DIAGNOSIS — Z00.00 WELL WOMAN EXAM WITHOUT GYNECOLOGICAL EXAM: Primary | ICD-10-CM

## 2024-04-22 DIAGNOSIS — Z13.6 SCREENING FOR CARDIOVASCULAR CONDITION: ICD-10-CM

## 2024-04-22 DIAGNOSIS — Z13.1 SCREENING FOR DIABETES MELLITUS: ICD-10-CM

## 2024-04-22 PROBLEM — E06.3 AUTOIMMUNE THYROIDITIS: Status: ACTIVE | Noted: 2024-04-22

## 2024-04-22 PROCEDURE — 99395 PREV VISIT EST AGE 18-39: CPT | Performed by: FAMILY MEDICINE

## 2024-04-22 SDOH — ECONOMIC STABILITY: FOOD INSECURITY: WITHIN THE PAST 12 MONTHS, YOU WORRIED THAT YOUR FOOD WOULD RUN OUT BEFORE YOU GOT MONEY TO BUY MORE.: NEVER TRUE

## 2024-04-22 SDOH — ECONOMIC STABILITY: HOUSING INSECURITY
IN THE LAST 12 MONTHS, WAS THERE A TIME WHEN YOU DID NOT HAVE A STEADY PLACE TO SLEEP OR SLEPT IN A SHELTER (INCLUDING NOW)?: NO

## 2024-04-22 SDOH — ECONOMIC STABILITY: FOOD INSECURITY: WITHIN THE PAST 12 MONTHS, THE FOOD YOU BOUGHT JUST DIDN'T LAST AND YOU DIDN'T HAVE MONEY TO GET MORE.: NEVER TRUE

## 2024-04-22 SDOH — ECONOMIC STABILITY: INCOME INSECURITY: HOW HARD IS IT FOR YOU TO PAY FOR THE VERY BASICS LIKE FOOD, HOUSING, MEDICAL CARE, AND HEATING?: NOT HARD AT ALL

## 2024-04-22 ASSESSMENT — ENCOUNTER SYMPTOMS
RESPIRATORY NEGATIVE: 1
ABDOMINAL PAIN: 1
ALLERGIC/IMMUNOLOGIC NEGATIVE: 1
EYES NEGATIVE: 1

## 2024-04-22 ASSESSMENT — PATIENT HEALTH QUESTIONNAIRE - PHQ9
2. FEELING DOWN, DEPRESSED OR HOPELESS: NOT AT ALL
SUM OF ALL RESPONSES TO PHQ QUESTIONS 1-9: 0
SUM OF ALL RESPONSES TO PHQ QUESTIONS 1-9: 0
1. LITTLE INTEREST OR PLEASURE IN DOING THINGS: NOT AT ALL
SUM OF ALL RESPONSES TO PHQ QUESTIONS 1-9: 0
SUM OF ALL RESPONSES TO PHQ QUESTIONS 1-9: 0
SUM OF ALL RESPONSES TO PHQ9 QUESTIONS 1 & 2: 0

## 2024-04-22 NOTE — PROGRESS NOTES
Mishel Stern (:  1990) is a 34 y.o. female,Established patient, here for evaluation of the following chief complaint(s):  Established New Doctor      Assessment & Plan   ASSESSMENT/PLAN:  1. Well woman exam without gynecological exam  A1C and TFTs per endocrinology  Pt to return for fasting labs.   -     CBC with Auto Differential; Future  -     Comprehensive Metabolic Panel; Future  -     Lipid Panel; Future  2. Screening for cardiovascular condition  -     Lipid Panel; Future  3. Screening for diabetes mellitus  -     Comprehensive Metabolic Panel; Future  Discussed with her pt concerns for abd discomfort and GI sx. Will start with dietary changes and OTC pepcid. Information given in AVS. Advised pt to return if her sx persist for additional medication management or referral if indicated.     Return in about 1 year (around 2025) for CPE with fasting labs.           Subjective   SUBJECTIVE/OBJECTIVE:  Pt is a  presenting for WWE  History and physical done with  #[id number]  Has Ob GYN  Acute complaints: none  Age of menarche: 13  LMP:  Patient's last menstrual period was 2024.  Menses: regular, every 28 days, light flow  Complications of menses: none  Current relationship status:   Contraception: condoms  Hx of STI: never  STI screening discussed: low risk and not indicated  Pap smear: done with Ob GYN  Vaccinations: UTD. Last tdap done during pregnancy  Dental: UTD  Vision: encouraged yearly eye exams     Social Hx:   Originally from [place] and she has been in the [place] for 5 yrs.   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     Past Medical History:   Diagnosis Date    Autoimmune thyroiditis     Subchorionic hemorrhage in first trimester 2019     Past Surgical History:   Procedure Laterality Date     SECTION      x 2    DILATION AND CURETTAGE OF UTERUS      post ectopic pregnancy    TONSILLECTOMY        Family History   Problem Relation Age of Onset    Thyroid

## 2024-04-22 NOTE — PATIENT INSTRUCTIONS
Continue the routine care with the endocrinologist.  Schedule an appt for routine fasting labs at your earliest convenience.

## 2024-04-22 NOTE — PROGRESS NOTES
Rm:02    Chief Complaint   Patient presents with    Established New Doctor    Has OBGYN      \"Have you been to the ER, urgent care clinic since your last visit?  Hospitalized since your last visit?\"    NO    “Have you seen or consulted any other health care providers outside of [facility] since your last visit?”    NO      “Have you had a pap smear?”    Yes : [place] [facility]     No cervical cancer screening on file             Click Here for Release of Records Request

## 2024-05-08 LAB
ALBUMIN SERPL-MCNC: 4.3 G/DL (ref 3.9–4.9)
ALBUMIN/GLOB SERPL: 1.7 {RATIO} (ref 1.2–2.2)
ALP SERPL-CCNC: 60 IU/L (ref 44–121)
ALT SERPL-CCNC: 16 IU/L (ref 0–32)
AST SERPL-CCNC: 19 IU/L (ref 0–40)
BASOPHILS # BLD AUTO: 0.1 X10E3/UL (ref 0–0.2)
BASOPHILS NFR BLD AUTO: 1 %
BILIRUB SERPL-MCNC: 1.1 MG/DL (ref 0–1.2)
BUN SERPL-MCNC: 6 MG/DL (ref 6–20)
BUN/CREAT SERPL: 9 (ref 9–23)
CALCIUM SERPL-MCNC: 9.4 MG/DL (ref 8.7–10.2)
CHLORIDE SERPL-SCNC: 104 MMOL/L (ref 96–106)
CHOLEST SERPL-MCNC: 171 MG/DL (ref 100–199)
CO2 SERPL-SCNC: 21 MMOL/L (ref 20–29)
CREAT SERPL-MCNC: 0.66 MG/DL (ref 0.57–1)
EOSINOPHIL # BLD AUTO: 0.1 X10E3/UL (ref 0–0.4)
EOSINOPHIL NFR BLD AUTO: 1 %
ERYTHROCYTE [DISTWIDTH] IN BLOOD BY AUTOMATED COUNT: 12.8 % (ref 11.7–15.4)
GLOBULIN SER CALC-MCNC: 2.6 G/DL (ref 1.5–4.5)
GLUCOSE SERPL-MCNC: 90 MG/DL (ref 70–99)
HCT VFR BLD AUTO: 40.9 % (ref 34–46.6)
HDLC SERPL-MCNC: 37 MG/DL
HGB BLD-MCNC: 14 G/DL (ref 11.1–15.9)
IMM GRANULOCYTES # BLD AUTO: 0 X10E3/UL (ref 0–0.1)
IMM GRANULOCYTES NFR BLD AUTO: 0 %
LDLC SERPL CALC-MCNC: 112 MG/DL (ref 0–99)
LYMPHOCYTES # BLD AUTO: 2.1 X10E3/UL (ref 0.7–3.1)
LYMPHOCYTES NFR BLD AUTO: 26 %
MCH RBC QN AUTO: 29.3 PG (ref 26.6–33)
MCHC RBC AUTO-ENTMCNC: 34.2 G/DL (ref 31.5–35.7)
MCV RBC AUTO: 86 FL (ref 79–97)
MONOCYTES # BLD AUTO: 0.5 X10E3/UL (ref 0.1–0.9)
MONOCYTES NFR BLD AUTO: 6 %
NEUTROPHILS # BLD AUTO: 5.4 X10E3/UL (ref 1.4–7)
NEUTROPHILS NFR BLD AUTO: 66 %
PLATELET # BLD AUTO: 288 X10E3/UL (ref 150–450)
POTASSIUM SERPL-SCNC: 4.7 MMOL/L (ref 3.5–5.2)
PROT SERPL-MCNC: 6.9 G/DL (ref 6–8.5)
RBC # BLD AUTO: 4.78 X10E6/UL (ref 3.77–5.28)
SODIUM SERPL-SCNC: 138 MMOL/L (ref 134–144)
TRIGL SERPL-MCNC: 121 MG/DL (ref 0–149)
VLDLC SERPL CALC-MCNC: 22 MG/DL (ref 5–40)
WBC # BLD AUTO: 8.1 X10E3/UL (ref 3.4–10.8)

## 2024-12-30 ENCOUNTER — APPOINTMENT (OUTPATIENT)
Facility: HOSPITAL | Age: 34
End: 2024-12-30
Payer: MEDICAID

## 2024-12-30 ENCOUNTER — HOSPITAL ENCOUNTER (EMERGENCY)
Facility: HOSPITAL | Age: 34
Discharge: HOME | End: 2024-12-30
Attending: STUDENT IN AN ORGANIZED HEALTH CARE EDUCATION/TRAINING PROGRAM
Payer: MEDICAID

## 2024-12-30 VITALS
HEART RATE: 83 BPM | BODY MASS INDEX: 27.61 KG/M2 | OXYGEN SATURATION: 100 % | TEMPERATURE: 98.1 F | RESPIRATION RATE: 16 BRPM | HEIGHT: 67 IN | WEIGHT: 175.93 LBS | DIASTOLIC BLOOD PRESSURE: 80 MMHG | SYSTOLIC BLOOD PRESSURE: 120 MMHG

## 2024-12-30 DIAGNOSIS — N93.8 DUB (DYSFUNCTIONAL UTERINE BLEEDING): Primary | ICD-10-CM

## 2024-12-30 LAB
AMORPH CRY URNS QL MICRO: ABNORMAL
ANION GAP SERPL CALC-SCNC: 8 MMOL/L (ref 2–12)
APPEARANCE UR: CLEAR
BACTERIA URNS QL MICRO: ABNORMAL /HPF
BASOPHILS # BLD: 0 K/UL (ref 0–0.1)
BASOPHILS NFR BLD: 1 % (ref 0–1)
BILIRUB UR QL: NEGATIVE
BUN SERPL-MCNC: 14 MG/DL (ref 6–20)
BUN/CREAT SERPL: 19 (ref 12–20)
CALCIUM SERPL-MCNC: 8.8 MG/DL (ref 8.5–10.1)
CHLORIDE SERPL-SCNC: 104 MMOL/L (ref 97–108)
CO2 SERPL-SCNC: 26 MMOL/L (ref 21–32)
COLOR UR: ABNORMAL
CREAT SERPL-MCNC: 0.73 MG/DL (ref 0.55–1.02)
DIFFERENTIAL METHOD BLD: NORMAL
EOSINOPHIL # BLD: 0.1 K/UL (ref 0–0.4)
EOSINOPHIL NFR BLD: 1 % (ref 0–7)
EPITH CASTS URNS QL MICRO: ABNORMAL /LPF
ERYTHROCYTE [DISTWIDTH] IN BLOOD BY AUTOMATED COUNT: 12.8 % (ref 11.5–14.5)
GLUCOSE SERPL-MCNC: 95 MG/DL (ref 65–100)
GLUCOSE UR STRIP.AUTO-MCNC: NEGATIVE MG/DL
HCG SERPL-ACNC: <1 MIU/ML (ref 0–6)
HCT VFR BLD AUTO: 39.8 % (ref 35–47)
HGB BLD-MCNC: 13.6 G/DL (ref 11.5–16)
HGB UR QL STRIP: ABNORMAL
IMM GRANULOCYTES # BLD AUTO: 0 K/UL (ref 0–0.04)
IMM GRANULOCYTES NFR BLD AUTO: 0 % (ref 0–0.5)
KETONES UR QL STRIP.AUTO: NEGATIVE MG/DL
LEUKOCYTE ESTERASE UR QL STRIP.AUTO: NEGATIVE
LYMPHOCYTES # BLD: 1.9 K/UL (ref 0.8–3.5)
LYMPHOCYTES NFR BLD: 23 % (ref 12–49)
MCH RBC QN AUTO: 28.9 PG (ref 26–34)
MCHC RBC AUTO-ENTMCNC: 34.2 G/DL (ref 30–36.5)
MCV RBC AUTO: 84.5 FL (ref 80–99)
MONOCYTES # BLD: 0.5 K/UL (ref 0–1)
MONOCYTES NFR BLD: 6 % (ref 5–13)
NEUTS SEG # BLD: 5.8 K/UL (ref 1.8–8)
NEUTS SEG NFR BLD: 69 % (ref 32–75)
NITRITE UR QL STRIP.AUTO: NEGATIVE
NRBC # BLD: 0 K/UL (ref 0–0.01)
NRBC BLD-RTO: 0 PER 100 WBC
PH UR STRIP: 5.5 (ref 5–8)
PLATELET # BLD AUTO: 334 K/UL (ref 150–400)
PMV BLD AUTO: 12.1 FL (ref 8.9–12.9)
POTASSIUM SERPL-SCNC: 4 MMOL/L (ref 3.5–5.1)
PROT UR STRIP-MCNC: NEGATIVE MG/DL
RBC # BLD AUTO: 4.71 M/UL (ref 3.8–5.2)
RBC #/AREA URNS HPF: ABNORMAL /HPF (ref 0–5)
SODIUM SERPL-SCNC: 138 MMOL/L (ref 136–145)
SP GR UR REFRACTOMETRY: 1.02 (ref 1–1.03)
TSH SERPL DL<=0.05 MIU/L-ACNC: 1.58 UIU/ML (ref 0.36–3.74)
UROBILINOGEN UR QL STRIP.AUTO: 0.2 EU/DL (ref 0.2–1)
WBC # BLD AUTO: 8.3 K/UL (ref 3.6–11)
WBC URNS QL MICRO: ABNORMAL /HPF (ref 0–4)

## 2024-12-30 PROCEDURE — 80048 BASIC METABOLIC PNL TOTAL CA: CPT

## 2024-12-30 PROCEDURE — 84702 CHORIONIC GONADOTROPIN TEST: CPT

## 2024-12-30 PROCEDURE — 74177 CT ABD & PELVIS W/CONTRAST: CPT

## 2024-12-30 PROCEDURE — 99285 EMERGENCY DEPT VISIT HI MDM: CPT

## 2024-12-30 PROCEDURE — 81001 URINALYSIS AUTO W/SCOPE: CPT

## 2024-12-30 PROCEDURE — 6360000004 HC RX CONTRAST MEDICATION: Performed by: STUDENT IN AN ORGANIZED HEALTH CARE EDUCATION/TRAINING PROGRAM

## 2024-12-30 PROCEDURE — 36415 COLL VENOUS BLD VENIPUNCTURE: CPT

## 2024-12-30 PROCEDURE — 85025 COMPLETE CBC W/AUTO DIFF WBC: CPT

## 2024-12-30 PROCEDURE — 84443 ASSAY THYROID STIM HORMONE: CPT

## 2024-12-30 PROCEDURE — 84439 ASSAY OF FREE THYROXINE: CPT

## 2024-12-30 RX ORDER — IBUPROFEN 600 MG/1
600 TABLET, FILM COATED ORAL 3 TIMES DAILY PRN
Qty: 15 TABLET | Refills: 0 | Status: SHIPPED | OUTPATIENT
Start: 2024-12-30 | End: 2025-01-04

## 2024-12-30 RX ORDER — IOPAMIDOL 755 MG/ML
100 INJECTION, SOLUTION INTRAVASCULAR
Status: COMPLETED | OUTPATIENT
Start: 2024-12-30 | End: 2024-12-30

## 2024-12-30 RX ADMIN — IOPAMIDOL 100 ML: 755 INJECTION, SOLUTION INTRAVENOUS at 12:40

## 2024-12-30 ASSESSMENT — LIFESTYLE VARIABLES
HOW OFTEN DO YOU HAVE A DRINK CONTAINING ALCOHOL: NEVER
HOW MANY STANDARD DRINKS CONTAINING ALCOHOL DO YOU HAVE ON A TYPICAL DAY: PATIENT DOES NOT DRINK

## 2024-12-30 ASSESSMENT — PAIN DESCRIPTION - LOCATION: LOCATION: ABDOMEN

## 2024-12-30 ASSESSMENT — PAIN SCALES - GENERAL: PAINLEVEL_OUTOF10: 5

## 2024-12-30 ASSESSMENT — PAIN DESCRIPTION - PAIN TYPE: TYPE: ACUTE PAIN

## 2024-12-30 ASSESSMENT — PAIN - FUNCTIONAL ASSESSMENT: PAIN_FUNCTIONAL_ASSESSMENT: 0-10

## 2024-12-30 NOTE — ED TRIAGE NOTES
Pt arrives with complaint of period for 15 days. Pt is having abdominal pain and cramping. Pt using 2-3 pads a day.

## 2024-12-30 NOTE — DISCHARGE INSTRUCTIONS
You presented to the ED with extended vaginal bleeding from your menstrual cycle.  Workup here shows no anemia.  You are not pregnant.  CT scan shows no obvious cause of the pain in your abdomen.  I suspect you have some dysfunctional uterine bleeding.  Recommend taking Motrin to help decrease bleeding.  Contact your PCP for outpatient follow-up and further management of abnormal uterine bleeding

## 2024-12-30 NOTE — ED PROVIDER NOTES
EMERGENCY DEPARTMENT PHYSICIAN NOTE     Patient: Mishel Stern     Time of Service: 2024  8:50 AM     Chief complaint:   Chief Complaint   Patient presents with    Vaginal Bleeding        HISTORY:  Patient is a 34 y.o. female who presents to the emergency department with complaints of 2 weeks of vaginal bleeding and pain. No chance of pregnancy per patient. Patient reports history of hypothyroid. No dysuria. No dizziness, lightheadedness, shortness of breath or chest pain. Cycle usually lasts 6-7 days. This time it started to fade at day 7 but then it came back. Patient is having some abdominal tenderness.      Past Medical History:   Diagnosis Date    Autoimmune thyroiditis     Subchorionic hemorrhage in first trimester 2019        Past Surgical History:   Procedure Laterality Date     SECTION      x 2    DILATION AND CURETTAGE OF UTERUS      post ectopic pregnancy    TONSILLECTOMY          Family History   Problem Relation Age of Onset    Thyroid Disease Mother     Hypertension Father     Diabetes Mother         Social History     Socioeconomic History    Marital status:    Tobacco Use    Smoking status: Never    Smokeless tobacco: Never   Vaping Use    Vaping status: Never Used   Substance and Sexual Activity    Alcohol use: Never    Drug use: Never     Social Determinants of Health     Financial Resource Strain: Low Risk  (2024)    Overall Financial Resource Strain (CARDIA)     Difficulty of Paying Living Expenses: Not hard at all   Food Insecurity: No Food Insecurity (2024)    Hunger Vital Sign     Worried About Running Out of Food in the Last Year: Never true     Ran Out of Food in the Last Year: Never true   Transportation Needs: Unknown (2024)    PRAPARE - Transportation     Lack of Transportation (Non-Medical): No   Housing Stability: Unknown (2024)    Housing Stability Vital Sign     Unstable Housing in the Last Year: No        Current Medications:  help decrease bleeding.  Contact your PCP for outpatient follow-up and further management of abnormal uterine bleeding    The patient has been re-evaluated and feeling better. Patient is stable for discharge.  All available radiology and laboratory results have been reviewed with patient and/or available family.  Patient and/or family verbally conveyed their understanding and agreement of the patient's signs, symptoms, diagnosis, treatment and prognosis and additionally agree to follow-up as recommended in the discharge instructions or to return to the Emergency Department should their condition change or worsen prior to their follow-up appointment.  All questions have been answered and patient and/or available family express understanding.      Prescriptions provided to the patient:     Discharge Medication List as of 12/30/2024  2:37 PM        START taking these medications    Details   ibuprofen (ADVIL;MOTRIN) 600 MG tablet Take 1 tablet by mouth 3 times daily as needed for Pain, Disp-15 tablet, R-0Normal              Jayesh Gandhi MD   Emergency Medicine Attending Physician            Jayesh Gandhi MD  01/05/25 3738

## 2024-12-31 LAB — T4 FREE SERPL-MCNC: 1.4 NG/DL (ref 0.8–1.5)

## 2025-04-23 ENCOUNTER — OFFICE VISIT (OUTPATIENT)
Age: 35
End: 2025-04-23
Payer: MEDICAID

## 2025-04-23 VITALS
HEIGHT: 66 IN | BODY MASS INDEX: 28.28 KG/M2 | HEART RATE: 80 BPM | TEMPERATURE: 98 F | WEIGHT: 176 LBS | RESPIRATION RATE: 16 BRPM | SYSTOLIC BLOOD PRESSURE: 106 MMHG | DIASTOLIC BLOOD PRESSURE: 75 MMHG | OXYGEN SATURATION: 99 %

## 2025-04-23 DIAGNOSIS — Z13.6 SCREENING FOR CARDIOVASCULAR CONDITION: ICD-10-CM

## 2025-04-23 DIAGNOSIS — E78.00 ELEVATED LDL CHOLESTEROL LEVEL: ICD-10-CM

## 2025-04-23 DIAGNOSIS — Z00.00 WELL WOMAN EXAM WITHOUT GYNECOLOGICAL EXAM: Primary | ICD-10-CM

## 2025-04-23 DIAGNOSIS — E04.1 THYROID NODULE: ICD-10-CM

## 2025-04-23 DIAGNOSIS — Z13.1 SCREENING FOR DIABETES MELLITUS: ICD-10-CM

## 2025-04-23 PROCEDURE — 99395 PREV VISIT EST AGE 18-39: CPT | Performed by: FAMILY MEDICINE

## 2025-04-23 SDOH — ECONOMIC STABILITY: FOOD INSECURITY: WITHIN THE PAST 12 MONTHS, THE FOOD YOU BOUGHT JUST DIDN'T LAST AND YOU DIDN'T HAVE MONEY TO GET MORE.: NEVER TRUE

## 2025-04-23 SDOH — ECONOMIC STABILITY: FOOD INSECURITY: WITHIN THE PAST 12 MONTHS, YOU WORRIED THAT YOUR FOOD WOULD RUN OUT BEFORE YOU GOT MONEY TO BUY MORE.: NEVER TRUE

## 2025-04-23 ASSESSMENT — PATIENT HEALTH QUESTIONNAIRE - PHQ9
2. FEELING DOWN, DEPRESSED OR HOPELESS: NOT AT ALL
1. LITTLE INTEREST OR PLEASURE IN DOING THINGS: NOT AT ALL
SUM OF ALL RESPONSES TO PHQ QUESTIONS 1-9: 0

## 2025-04-23 NOTE — PATIENT INSTRUCTIONS
Keep your routinely scheduled appts with the endocrinologist.   You are due for an ultrasound of your thyroid.   You can discuss with the endocrinologist the additional hair changes and the results will determine further recommendations.   You have a benign skin condition on your back called dilated port of marcel.  You can return to have a procedure to remove it if you are interested.

## 2025-04-23 NOTE — PROGRESS NOTES
RM:1    Chief Complaint   Patient presents with    Annual Exam     Physical with fasting labs        Vitals:    04/23/25 0937   BP: 106/75   BP Site: Left Upper Arm   Patient Position: Sitting   BP Cuff Size: Large Adult   Pulse: 80   Resp: 16   Temp: 98 °F (36.7 °C)   TempSrc: Oral   SpO2: 99%   Weight: 79.8 kg (176 lb)   Height: 1.676 m (5' 6\")        FASTING: Yes    \"Have you been to the ER, urgent care clinic since your last visit?  Hospitalized since your last visit?\"    NO    “Have you seen or consulted any other health care providers outside of [facility] since your last visit?”    NO     “Have you had a pap smear?”    YES - Where: 2023 - [facility] [facility]  Nurse/CMA to request most recent records if not in the chart    No cervical cancer screening on file         - [id number]    Click Here for Release of Records Request

## 2025-04-24 ENCOUNTER — RESULTS FOLLOW-UP (OUTPATIENT)
Age: 35
End: 2025-04-24

## 2025-04-24 ENCOUNTER — OFFICE VISIT (OUTPATIENT)
Age: 35
End: 2025-04-24
Payer: MEDICAID

## 2025-04-24 VITALS
DIASTOLIC BLOOD PRESSURE: 66 MMHG | OXYGEN SATURATION: 97 % | RESPIRATION RATE: 16 BRPM | WEIGHT: 177 LBS | BODY MASS INDEX: 26.83 KG/M2 | HEIGHT: 68 IN | SYSTOLIC BLOOD PRESSURE: 100 MMHG | HEART RATE: 85 BPM

## 2025-04-24 DIAGNOSIS — E06.3 AUTOIMMUNE THYROIDITIS: ICD-10-CM

## 2025-04-24 DIAGNOSIS — E55.9 VITAMIN D DEFICIENCY: ICD-10-CM

## 2025-04-24 DIAGNOSIS — R68.89 COLD INTOLERANCE: ICD-10-CM

## 2025-04-24 DIAGNOSIS — E06.3 AUTOIMMUNE THYROIDITIS: Primary | ICD-10-CM

## 2025-04-24 DIAGNOSIS — E53.8 VITAMIN B12 DEFICIENCY: ICD-10-CM

## 2025-04-24 LAB
ALBUMIN SERPL-MCNC: 4.2 G/DL (ref 3.9–4.9)
ALP SERPL-CCNC: 45 IU/L (ref 44–121)
ALT SERPL-CCNC: 11 IU/L (ref 0–32)
AST SERPL-CCNC: 15 IU/L (ref 0–40)
BASOPHILS # BLD AUTO: 0.1 X10E3/UL (ref 0–0.2)
BASOPHILS NFR BLD AUTO: 1 %
BILIRUB SERPL-MCNC: 0.7 MG/DL (ref 0–1.2)
BUN SERPL-MCNC: 12 MG/DL (ref 6–20)
BUN/CREAT SERPL: 17 (ref 9–23)
CALCIUM SERPL-MCNC: 9 MG/DL (ref 8.7–10.2)
CHLORIDE SERPL-SCNC: 104 MMOL/L (ref 96–106)
CHOLEST SERPL-MCNC: 185 MG/DL (ref 100–199)
CO2 SERPL-SCNC: 21 MMOL/L (ref 20–29)
CREAT SERPL-MCNC: 0.72 MG/DL (ref 0.57–1)
EOSINOPHIL # BLD AUTO: 0.1 X10E3/UL (ref 0–0.4)
EOSINOPHIL NFR BLD AUTO: 1 %
ERYTHROCYTE [DISTWIDTH] IN BLOOD BY AUTOMATED COUNT: 13.1 % (ref 11.7–15.4)
GLOBULIN SER CALC-MCNC: 2.8 G/DL (ref 1.5–4.5)
GLUCOSE SERPL-MCNC: 87 MG/DL (ref 70–99)
HCT VFR BLD AUTO: 40.4 % (ref 34–46.6)
HDLC SERPL-MCNC: 42 MG/DL
HGB BLD-MCNC: 13.4 G/DL (ref 11.1–15.9)
IMM GRANULOCYTES # BLD AUTO: 0 X10E3/UL (ref 0–0.1)
IMM GRANULOCYTES NFR BLD AUTO: 0 %
LDLC SERPL CALC-MCNC: 124 MG/DL (ref 0–99)
LYMPHOCYTES # BLD AUTO: 2 X10E3/UL (ref 0.7–3.1)
LYMPHOCYTES NFR BLD AUTO: 26 %
MCH RBC QN AUTO: 29.7 PG (ref 26.6–33)
MCHC RBC AUTO-ENTMCNC: 33.2 G/DL (ref 31.5–35.7)
MCV RBC AUTO: 90 FL (ref 79–97)
MONOCYTES # BLD AUTO: 0.5 X10E3/UL (ref 0.1–0.9)
MONOCYTES NFR BLD AUTO: 6 %
NEUTROPHILS # BLD AUTO: 5.1 X10E3/UL (ref 1.4–7)
NEUTROPHILS NFR BLD AUTO: 66 %
PLATELET # BLD AUTO: 292 X10E3/UL (ref 150–450)
POTASSIUM SERPL-SCNC: 4.7 MMOL/L (ref 3.5–5.2)
PROT SERPL-MCNC: 7 G/DL (ref 6–8.5)
RBC # BLD AUTO: 4.51 X10E6/UL (ref 3.77–5.28)
SODIUM SERPL-SCNC: 137 MMOL/L (ref 134–144)
TRIGL SERPL-MCNC: 107 MG/DL (ref 0–149)
VLDLC SERPL CALC-MCNC: 19 MG/DL (ref 5–40)
WBC # BLD AUTO: 7.7 X10E3/UL (ref 3.4–10.8)

## 2025-04-24 PROCEDURE — 99214 OFFICE O/P EST MOD 30 MIN: CPT | Performed by: INTERNAL MEDICINE

## 2025-04-24 PROCEDURE — G2211 COMPLEX E/M VISIT ADD ON: HCPCS | Performed by: INTERNAL MEDICINE

## 2025-04-24 RX ORDER — LEVOTHYROXINE SODIUM 50 UG/1
50 TABLET ORAL
Qty: 90 TABLET | Refills: 3 | Status: SHIPPED | OUTPATIENT
Start: 2025-04-24

## 2025-04-24 NOTE — PROGRESS NOTES
Chief Complaint   Patient presents with    Thyroid Problem    Medication Refill     [facility]        CHIEF COMPLAINT : f/u Thyroid nodule, hashimoto thyroiditis      [phone] Sharonda Marquis       HISTORY OF PRESENT ILLNESS:    Mishle Stern is a 35 y.o.  female with with minimal past medical history seen in transfer from her previous endocrinologist for f/u of a thyroid nodule and goiter.       Previously with Ken[facility]:   Patient described that she had some dysphagia felt a lump in her neck,   She reported some hair loss in addition to the mild dysphagia,   A thyroid ultrasound was obtained by her primary doctor, see below,   Family history of thyroid disorder is noted in sister, treated,   No known radiation exposure.   No history of hyper or hypothyroidism.       6/2/20: Thyroid Ultrasound:     Mildly enlarged: R lobe 5.5 cm, L lobe 5.8 cm    Solitary L lower 9 mm solid thyroid nodule    No other description in report      We discussed biopsy vs surveillance US and she elected surveillance.       12/7/20: Thyroid Ultrasound    Unchanged 1.1 x 0.7 x 0.7 cm left lower nodule      She was noted for TPO+ finding,   TSH repeated now at 4.47, symptomatic      06/07/2021: Endorses dyspnea, headache. Dyspnea has occurred since prior to taking the med, for 4 years. Present at rest and on exertion. 6  months ago had blood tests. Endorses dysphagia. Not interest in pursuing fertility in next 1 year.  When describing thyroid fullness points to the submandibular area.  Reports that she was told that she could have her thyroid taken out previously.  Has  not noticed any improvement in symptoms with initiation of levothyroxine.  Is requesting a medication for weight loss.      08/16/2021: Is headed to [facility] today, they're going to visit Yakov Mendoza. Has been taking levothyroxine 25mcg since we spoke last in June.  Is running out of prescription. Endorses fullness in the neck- was never called to have the ultrasound done.

## 2025-04-24 NOTE — RESULT ENCOUNTER NOTE
letter:  all of your labs are within acceptable limits except your cholesterol levels are abnormal.  The LDL, which is considered the \"bad\" cholesterol, is 124.  The HDL, which is considered the \"good\" cholesterol, is 42.  You can reduce this risk by adopting the DASH diet, mediterranean diet, or a plant based diet, all of which are low carb and low cholesterol diets.  Some additional dietary changes include: substituting soy based products for meat in some recipes, eating leaner meats such as fish, poultry for red meats, adding whole grains, and cutting back on butter and sweetened soft drinks.  Dietary supplements such as red yeast rice, green tea extracts, flaxseed, fiber supplements, and omega 3 fatty acids.  In addition to the dietary changes, make sure to get regular exercise, 30-60 min of moderate intensity exercise at least 3 days per week (at least 150 min of exercise per week.).  All of these lifestyle changes will also help with weight loss/weight maintenance and lowering of the cholesterol.    Otherwise keep up the great work!  Follow up yearly or sooner if needed.

## 2025-04-25 LAB
25(OH)D3+25(OH)D2 SERPL-MCNC: 10.8 NG/ML (ref 30–100)
IRON SATN MFR SERPL: 22 % (ref 15–55)
IRON SERPL-MCNC: 99 UG/DL (ref 27–159)
T4 FREE SERPL-MCNC: 1.31 NG/DL (ref 0.82–1.77)
TIBC SERPL-MCNC: 452 UG/DL (ref 250–450)
TSH SERPL DL<=0.005 MIU/L-ACNC: 2.18 UIU/ML (ref 0.45–4.5)
UIBC SERPL-MCNC: 353 UG/DL (ref 131–425)
VIT B12 SERPL-MCNC: 299 PG/ML (ref 232–1245)

## 2025-04-29 ASSESSMENT — ENCOUNTER SYMPTOMS
COLOR CHANGE: 1
GASTROINTESTINAL NEGATIVE: 1

## 2025-05-02 ENCOUNTER — RESULTS FOLLOW-UP (OUTPATIENT)
Age: 35
End: 2025-05-02

## 2025-05-15 ENCOUNTER — HOSPITAL ENCOUNTER (OUTPATIENT)
Facility: HOSPITAL | Age: 35
Discharge: HOME | End: 2025-05-18
Attending: INTERNAL MEDICINE
Payer: MEDICAID

## 2025-05-15 DIAGNOSIS — E06.3 AUTOIMMUNE THYROIDITIS: ICD-10-CM

## 2025-05-15 PROCEDURE — 76536 US EXAM OF HEAD AND NECK: CPT

## 2025-05-23 ENCOUNTER — RESULTS FOLLOW-UP (OUTPATIENT)
Age: 35
End: 2025-05-23

## (undated) DEVICE — STERILE POLYISOPRENE POWDER-FREE SURGICAL GLOVES: Brand: PROTEXIS

## (undated) DEVICE — PACK PROCEDURE SURG C SECT KT SMH

## (undated) DEVICE — CATH FOLEY 16F LUBRI-SIL IC --

## (undated) DEVICE — SOLIDIFIER MEDC 1200ML -- CONVERT TO 356117

## (undated) DEVICE — ANESTHESIA TRAY SPNL W/O NDL PENCAN

## (undated) DEVICE — STERILE POLYISOPRENE POWDER-FREE SURGICAL GLOVES WITH EMOLLIENT COATING: Brand: PROTEXIS

## (undated) DEVICE — REM POLYHESIVE ADULT PATIENT RETURN ELECTRODE: Brand: VALLEYLAB

## (undated) DEVICE — DEVON™ KNEE AND BODY STRAP 60" X 3" (1.5 M X 7.6 CM): Brand: DEVON

## (undated) DEVICE — ELECTROSURGICAL DEVICE HOLSTER;FOR USE WITH MAXIMUM PEAK VOLTAGE OF 4000 V: Brand: FORCE TRIVERSE

## (undated) DEVICE — SUTURE VCRL 0 L27IN ABSRB VLT CT L40MM 1/2 CIR TAPERPOINT J352H

## (undated) DEVICE — COVERALL PREM SMS 2XL KNIT --

## (undated) DEVICE — 3000CC GUARDIAN II: Brand: GUARDIAN

## (undated) DEVICE — SUTURE COAT VCRL SZ 4-0 L18IN ABSRB UD L19MM PS-2 1/2 CIR J496G

## (undated) DEVICE — SOLUTION IRRIG 1000ML H2O STRL BLT

## (undated) DEVICE — SUT CHRMC 1 36IN CTX BRN --

## (undated) DEVICE — KENDALL SCD EXPRESS SLEEVES, KNEE LENGTH, MEDIUM: Brand: KENDALL SCD

## (undated) DEVICE — LIGHT HANDLE: Brand: DEVON

## (undated) DEVICE — ROYALSILK SURGICAL GOWN, L: Brand: CONVERTORS

## (undated) DEVICE — MEDI-VAC NON-CONDUCTIVE SUCTION TUBING: Brand: CARDINAL HEALTH

## (undated) DEVICE — (D)PREP SKN CHLRAPRP APPL 26ML -- CONVERT TO ITEM 371833

## (undated) DEVICE — DRAPE FLD WRM W44XL66IN C6L FOR INTRATEMP SYS THERMABASIN

## (undated) DEVICE — SOLUTION IV 1000ML 0.9% SOD CHL

## (undated) DEVICE — POOLE SUCTION INSTRUMENT WITH REMOVABLE SHEATH: Brand: POOLE

## (undated) DEVICE — Z DUP USE 2227076 BARRIER ADH TISS 4-SECTION SEPRAFILM

## (undated) DEVICE — SUTURE PLN GUT SZ 2-0 L27IN ABSRB YELLOWISH TAN L70MM XLH 53T